# Patient Record
Sex: MALE | Race: BLACK OR AFRICAN AMERICAN | NOT HISPANIC OR LATINO | URBAN - METROPOLITAN AREA
[De-identification: names, ages, dates, MRNs, and addresses within clinical notes are randomized per-mention and may not be internally consistent; named-entity substitution may affect disease eponyms.]

---

## 2023-03-21 ENCOUNTER — OFFICE VISIT (OUTPATIENT)
Dept: FAMILY MEDICINE | Facility: CLINIC | Age: 33
End: 2023-03-21
Payer: COMMERCIAL

## 2023-03-21 VITALS
WEIGHT: 223 LBS | OXYGEN SATURATION: 97 % | HEART RATE: 76 BPM | DIASTOLIC BLOOD PRESSURE: 82 MMHG | RESPIRATION RATE: 16 BRPM | BODY MASS INDEX: 29.42 KG/M2 | TEMPERATURE: 98.6 F | SYSTOLIC BLOOD PRESSURE: 126 MMHG

## 2023-03-21 DIAGNOSIS — Z20.822 EXPOSURE TO 2019 NOVEL CORONAVIRUS: Primary | ICD-10-CM

## 2023-03-21 DIAGNOSIS — F11.20 NARCOTIC DEPENDENCE (H): ICD-10-CM

## 2023-03-21 PROCEDURE — U0003 INFECTIOUS AGENT DETECTION BY NUCLEIC ACID (DNA OR RNA); SEVERE ACUTE RESPIRATORY SYNDROME CORONAVIRUS 2 (SARS-COV-2) (CORONAVIRUS DISEASE [COVID-19]), AMPLIFIED PROBE TECHNIQUE, MAKING USE OF HIGH THROUGHPUT TECHNOLOGIES AS DESCRIBED BY CMS-2020-01-R: HCPCS | Performed by: PHYSICIAN ASSISTANT

## 2023-03-21 PROCEDURE — U0005 INFEC AGEN DETEC AMPLI PROBE: HCPCS | Performed by: PHYSICIAN ASSISTANT

## 2023-03-21 PROCEDURE — 99204 OFFICE O/P NEW MOD 45 MIN: CPT | Mod: CS | Performed by: PHYSICIAN ASSISTANT

## 2023-03-21 NOTE — LETTER
[facility]  [address]  [facility] [address]  [phone]      March 21, 2023    RE:  Edward Glover                                                                                                                                                       [address] [place] [id number]            To whom it may concern:    Edward Glover was seen in clinic today. Please excuse him from work today and tomorrow.        Sincerely,        ADOLPH Dejesus [facility]

## 2023-03-21 NOTE — PATIENT INSTRUCTIONS
COVID exposure:  Patient was educated on the natural course of viral illness.  Isolate at home. If COVID negative then may return to normal activities. Conservative measures discussed including increased fluids, warm steamy shower, and analgesics (Tylenol and/or Ibuprofen). See your primary care provider if symptoms worsen or do not improve in 7 days. Seek emergency care if you develop fever over 104 or shortness of breath.     Narcotic dependence:  Referred to chemical dependence specialist.

## 2023-03-22 ENCOUNTER — TELEPHONE (OUTPATIENT)
Dept: BEHAVIORAL HEALTH | Facility: CLINIC | Age: 33
End: 2023-03-22
Payer: COMMERCIAL

## 2023-03-22 LAB — SARS-COV-2 RNA RESP QL NAA+PROBE: NEGATIVE

## 2023-03-22 NOTE — TELEPHONE ENCOUNTER
Attempted to call patient to offer [facility] services, including walk-in hours. No answer; no voicemail; no MyChart.    [facility]  [address], [address]   [place], [place], [address]  Phone: [phone]  Fax: [phone]    Open Monday-Friday  Closed over lunch hour  Walk in hours: 9am-11:30am and 12:30-3pm    Aminah Cano RN on 3/22/2023 at 9:09 AM

## 2023-03-23 ENCOUNTER — HOSPITAL ENCOUNTER (OUTPATIENT)
Dept: CT IMAGING | Facility: HOSPITAL | Age: 33
Discharge: HOME | End: 2023-03-23
Attending: NURSE PRACTITIONER | Admitting: NURSE PRACTITIONER
Payer: COMMERCIAL

## 2023-03-23 ENCOUNTER — OFFICE VISIT (OUTPATIENT)
Dept: FAMILY MEDICINE | Facility: CLINIC | Age: 33
End: 2023-03-23
Payer: COMMERCIAL

## 2023-03-23 VITALS
TEMPERATURE: 97 F | HEART RATE: 90 BPM | DIASTOLIC BLOOD PRESSURE: 79 MMHG | OXYGEN SATURATION: 97 % | SYSTOLIC BLOOD PRESSURE: 123 MMHG

## 2023-03-23 DIAGNOSIS — R11.0 NAUSEA: ICD-10-CM

## 2023-03-23 DIAGNOSIS — R19.7 DIARRHEA, UNSPECIFIED TYPE: ICD-10-CM

## 2023-03-23 DIAGNOSIS — R11.2 NAUSEA AND VOMITING, UNSPECIFIED VOMITING TYPE: Primary | ICD-10-CM

## 2023-03-23 DIAGNOSIS — R10.31 RLQ ABDOMINAL PAIN: ICD-10-CM

## 2023-03-23 LAB
ANION GAP SERPL CALCULATED.3IONS-SCNC: 5 MMOL/L (ref 7–15)
BASOPHILS # BLD AUTO: 0 10E3/UL (ref 0–0.2)
BASOPHILS NFR BLD AUTO: 0 %
BUN SERPL-MCNC: 15.6 MG/DL (ref 6–20)
CALCIUM SERPL-MCNC: 9.5 MG/DL (ref 8.6–10)
CHLORIDE SERPL-SCNC: 101 MMOL/L (ref 98–107)
CREAT SERPL-MCNC: 0.97 MG/DL (ref 0.67–1.17)
DEPRECATED HCO3 PLAS-SCNC: 32 MMOL/L (ref 22–29)
EOSINOPHIL # BLD AUTO: 0.1 10E3/UL (ref 0–0.7)
EOSINOPHIL NFR BLD AUTO: 1 %
ERYTHROCYTE [DISTWIDTH] IN BLOOD BY AUTOMATED COUNT: 13.2 % (ref 10–15)
GFR SERPL CREATININE-BSD FRML MDRD: >90 ML/MIN/1.73M2
GLUCOSE SERPL-MCNC: 101 MG/DL (ref 70–99)
HCT VFR BLD AUTO: 40 % (ref 40–53)
HGB BLD-MCNC: 13.1 G/DL (ref 13.3–17.7)
IMM GRANULOCYTES # BLD: 0 10E3/UL
IMM GRANULOCYTES NFR BLD: 0 %
LYMPHOCYTES # BLD AUTO: 3.6 10E3/UL (ref 0.8–5.3)
LYMPHOCYTES NFR BLD AUTO: 46 %
MCH RBC QN AUTO: 24.4 PG (ref 26.5–33)
MCHC RBC AUTO-ENTMCNC: 32.8 G/DL (ref 31.5–36.5)
MCV RBC AUTO: 75 FL (ref 78–100)
MONOCYTES # BLD AUTO: 0.8 10E3/UL (ref 0–1.3)
MONOCYTES NFR BLD AUTO: 10 %
NEUTROPHILS # BLD AUTO: 3.4 10E3/UL (ref 1.6–8.3)
NEUTROPHILS NFR BLD AUTO: 43 %
PLATELET # BLD AUTO: 224 10E3/UL (ref 150–450)
POTASSIUM SERPL-SCNC: 3.8 MMOL/L (ref 3.4–5.3)
RBC # BLD AUTO: 5.37 10E6/UL (ref 4.4–5.9)
SODIUM SERPL-SCNC: 138 MMOL/L (ref 136–145)
WBC # BLD AUTO: 7.9 10E3/UL (ref 4–11)

## 2023-03-23 PROCEDURE — 36415 COLL VENOUS BLD VENIPUNCTURE: CPT | Performed by: NURSE PRACTITIONER

## 2023-03-23 PROCEDURE — 74177 CT ABD & PELVIS W/CONTRAST: CPT

## 2023-03-23 PROCEDURE — 99214 OFFICE O/P EST MOD 30 MIN: CPT | Mod: 25 | Performed by: NURSE PRACTITIONER

## 2023-03-23 PROCEDURE — 250N000011 HC RX IP 250 OP 636: Performed by: NURSE PRACTITIONER

## 2023-03-23 PROCEDURE — 85025 COMPLETE CBC W/AUTO DIFF WBC: CPT | Performed by: NURSE PRACTITIONER

## 2023-03-23 PROCEDURE — 96372 THER/PROPH/DIAG INJ SC/IM: CPT | Performed by: NURSE PRACTITIONER

## 2023-03-23 PROCEDURE — 80048 BASIC METABOLIC PNL TOTAL CA: CPT | Performed by: NURSE PRACTITIONER

## 2023-03-23 RX ORDER — ONDANSETRON 4 MG/1
4 TABLET, FILM COATED ORAL EVERY 8 HOURS PRN
Qty: 12 TABLET | Refills: 0 | Status: SHIPPED | OUTPATIENT
Start: 2023-03-23 | End: 2023-11-17

## 2023-03-23 RX ORDER — ONDANSETRON 4 MG/1
4 TABLET, ORALLY DISINTEGRATING ORAL ONCE
Status: COMPLETED | OUTPATIENT
Start: 2023-03-23 | End: 2023-03-23

## 2023-03-23 RX ORDER — KETOROLAC TROMETHAMINE 30 MG/ML
30 INJECTION, SOLUTION INTRAMUSCULAR; INTRAVENOUS ONCE
Status: COMPLETED | OUTPATIENT
Start: 2023-03-23 | End: 2023-03-23

## 2023-03-23 RX ORDER — IOPAMIDOL 755 MG/ML
100 INJECTION, SOLUTION INTRAVASCULAR ONCE
Status: COMPLETED | OUTPATIENT
Start: 2023-03-23 | End: 2023-03-23

## 2023-03-23 RX ADMIN — IOPAMIDOL 100 ML: 755 INJECTION, SOLUTION INTRAVENOUS at 16:35

## 2023-03-23 RX ADMIN — ONDANSETRON 4 MG: 4 TABLET, ORALLY DISINTEGRATING ORAL at 15:51

## 2023-03-23 RX ADMIN — KETOROLAC TROMETHAMINE 30 MG: 30 INJECTION, SOLUTION INTRAMUSCULAR; INTRAVENOUS at 15:52

## 2023-03-23 ASSESSMENT — ENCOUNTER SYMPTOMS
DIZZINESS: 1
WEAKNESS: 1

## 2023-03-23 NOTE — PROGRESS NOTES
Assessment & Plan     RLQ abdominal pain    - CT Abdomen Pelvis w Contrast  - ketorolac (TORADOL) injection 30 mg  - CBC with platelets and differential  - Basic metabolic panel  - CBC with platelets and differential  - Basic metabolic panel    Nausea    - ondansetron (ZOFRAN ODT) ODT tab 4 mg    Diarrhea, unspecified type    - Enteric Bacteria and Virus Panel by CLEMENTINA Stool  - C. difficile Toxin B PCR with reflex to C. difficile Antigen and Toxins A/B EIA  - Cryptosporidium/Giardia Immunoassay    Nausea and vomiting, unspecified vomiting type    - ondansetron (ZOFRAN) 4 MG tablet  Dispense: 12 tablet; Refill: 0     Patient with nausea, vomiting and diarrhea with right-sided lower abdomen pain with guarding that is worse with ambulation and moving.  Single episode of bright red bleeding with diarrhea today.  Patient told to be n.p.o.      CT scan to check for appendicitis or other acute abnormality if negative for appendicitis.    CBC essentially negative.    Due to severity of diarrhea with greater than 10 episodes for 4 days and bleeding noted today, did order stool samples.    Should be rechecked early next week if not much better.  May also use Imodium A-D as needed for greater than 5 or 6 stools.  ER if doing worse, more dizzy.    Patient says he feels much better overall with medications given here including Zofran and Toradol.    Pedialyte, bland diet discussed.  See AVS for details.          Return in about 4 days (around 3/27/2023).    Julianna Rao CNP  [facility]    Kale Leon is a 33 year old male who presents to clinic today for the following health issues:  Chief Complaint   Patient presents with     Vomiting     Seen on 3/21/23 with no improvement     Diarrhea     Dizziness     Lightheaded when standing     Abdominal Pain     R sided abd pain     HPI    Patient was seen on March 21 for what sounds like more of a URI.    Was having N/V/D 2 days ago as well.   "Symptoms started 4 days ago.  Diarrhea and vomiting has gotten worse.  Nothing for pain today.  Having trouble keeping down any fluids or food    Rt sided abd pain is 7/10.  Comes and goes.  \"Tingles when I sit down, worse with walking.\"      Vomited last 1200.  In the last 24 hours, has vomited 8 times.  \"can't keep anything.\"      Diarrhea x 10 times or so in the last 1 day.    Saw a little blood in toilet paper just today.      Fever last night. Felt hot, sweating.  Did not measure.    Still having throat pain.      Also was referred to addiction clinic for opiate abuse.  Was using Percocet pills.  Last Friday had pills, had 2-3.   Uses them here and there, maybe twice a month.    Patient did have antibiotics he believes in January for strep.       Review of Systems   Neurological: Positive for dizziness and weakness.           Objective    /79 (BP Location: Right arm, Patient Position: Sitting, Cuff Size: Adult Regular)   Pulse 90   Temp 97  F (36.1  C) (Tympanic)   SpO2 97%   Physical Exam  Constitutional:       Appearance: He is well-developed.   HENT:      Right Ear: External ear normal.      Left Ear: External ear normal.   Eyes:      General:         Right eye: No discharge.         Left eye: No discharge.      Conjunctiva/sclera: Conjunctivae normal.   Cardiovascular:      Pulses: Normal pulses.   Pulmonary:      Effort: Pulmonary effort is normal.   Abdominal:      General: Bowel sounds are normal.      Tenderness: There is abdominal tenderness. There is guarding (Right lower quadrant).   Musculoskeletal:         General: Normal range of motion.   Skin:     General: Skin is warm.   Neurological:      Mental Status: He is alert and oriented to person, place, and time.   Psychiatric:         Mood and Affect: Mood normal.         Behavior: Behavior normal.         Thought Content: Thought content normal.         Judgment: Judgment normal.        Results for orders placed or performed during the " hospital encounter of 03/23/23   CT Abdomen Pelvis w Contrast     Status: None    Narrative    EXAM: CT ABDOMEN PELVIS W CONTRAST  LOCATION: [facility]  DATE/TIME: 3/23/2023 4:43 PM    INDICATION: RLQ pain  COMPARISON: None.  TECHNIQUE: CT scan of the abdomen and pelvis was performed following injection of IV contrast. Multiplanar reformats were obtained. Dose reduction techniques were used.  CONTRAST: 100 mL Isovue 370    FINDINGS:   LOWER CHEST: No basilar infiltrates    HEPATOBILIARY: No biliary dilatation    PANCREAS: Unremarkable    SPLEEN: Unremarkable    ADRENAL GLANDS: Mild adrenal hyperplasia    KIDNEYS/BLADDER: No hydronephrosis. Normal bladder contour.    BOWEL: Normal caliber appendix. No adjacent inflammatory stranding. No bowel obstruction.    LYMPH NODES: No significant retroperitoneal adenopathy    VASCULATURE: No abdominal aortic aneurysm    PELVIC ORGANS: Multiple pelvic phleboliths. No free fluid.    MUSCULOSKELETAL: No acute bony abnormalities. Mild lumbar scoliosis, apex to the left.      Impression    IMPRESSION:   1.  No CT evidence of appendicitis.   Results for orders placed or performed in visit on 03/23/23   CBC with platelets and differential     Status: Abnormal   Result Value Ref Range    WBC Count 7.9 4.0 - 11.0 10e3/uL    RBC Count 5.37 4.40 - 5.90 10e6/uL    Hemoglobin 13.1 (L) 13.3 - 17.7 g/dL    Hematocrit 40.0 40.0 - 53.0 %    MCV 75 (L) 78 - 100 fL    MCH 24.4 (L) 26.5 - 33.0 pg    MCHC 32.8 31.5 - 36.5 g/dL    RDW 13.2 10.0 - 15.0 %    Platelet Count 224 150 - 450 10e3/uL    % Neutrophils 43 %    % Lymphocytes 46 %    % Monocytes 10 %    % Eosinophils 1 %    % Basophils 0 %    % Immature Granulocytes 0 %    Absolute Neutrophils 3.4 1.6 - 8.3 10e3/uL    Absolute Lymphocytes 3.6 0.8 - 5.3 10e3/uL    Absolute Monocytes 0.8 0.0 - 1.3 10e3/uL    Absolute Eosinophils 0.1 0.0 - 0.7 10e3/uL    Absolute Basophils 0.0 0.0 - 0.2 10e3/uL    Absolute Immature  Granulocytes 0.0 <=0.4 10e3/uL   CBC with platelets and differential     Status: Abnormal    Narrative    The following orders were created for panel order CBC with platelets and differential.  Procedure                               Abnormality         Status                     ---------                               -----------         ------                     CBC with platelets and d...[[id number]]  Abnormal            Final result                 Please view results for these tests on the individual orders.

## 2023-03-23 NOTE — PATIENT INSTRUCTIONS
For vomiting and diarrhea, start with sips of Pedialyte and increase amount as tolerated.  Okay to stop Pedialyte and switch to plain water if starting to eat more normally and diarrhea slows down.      Advance diet to bland if no vomiting for 4 hours and then advance as tolerated. See handout for more info.      Ondansetron as needed for nausea.  Next dose can be around bedtime at 10:00.    If you are having more than 5 or 6 stools per day, you may use Imodium AD available over-the-counter and follow package instructions.    No work until feeling better.    Return stool kits as soon as possible to our lab or any [facility] lab (eg. [place]).      Be seen early next week unless you are doing a lot better.  Sometimes this will resolve on its own if it is due to a virus.

## 2023-03-23 NOTE — LETTER
[address]  [facility] [address]  Phone: [phone]  Fax: [phone]    March 23, 2023        Edward Glover  [address]  [facility] [facility] [place] [address]          To whom it may concern:    RE: Edward Glover    Patient was seen and treated today at our clinic and missed work.  Excuse from work today and tomorrow for illness.    Please contact me for questions or concerns.      Sincerely,        Julianna Rao, CNP

## 2023-03-24 ENCOUNTER — TELEPHONE (OUTPATIENT)
Dept: BEHAVIORAL HEALTH | Facility: CLINIC | Age: 33
End: 2023-03-24
Payer: COMMERCIAL

## 2023-03-24 NOTE — TELEPHONE ENCOUNTER
Reason for call:  Other   Patient called regarding (reason for call): appointment  Additional comments: pt called stating that he was informed  That he can do telephone visit w/nurse to get sbxn prescription. I dis get patient scheduled in clinic for next week. Please call pt back with more details     Phone number to reach patient:  Home number on file [phone] (home)    Best Time:  Any     Can we leave a detailed message on this number?  YES    Travel screening: Negative

## 2023-03-24 NOTE — TELEPHONE ENCOUNTER
RN reviewed message from [facility] and called patient back to see if he had additional questions or needs.     Patient answered the phone and was calm and pleasant. He at first asked for a doctors note to help him get time off work next week to attend the scheduled appointment on 3/29/2023 but then stated he will figure it out. RN asked if he would like to be seen sooner and offered walk-in [facility] hours for today and in general.    Patient states he will come in today at 12:30. May need to cancel his 3/29/2023 appointment if patient presents as a walk-in prior to this.     Hours and location provided:    [facility]  [address], [address]   [place], [address]  Phone: [phone]  Fax: [phone]    Open Monday-Friday  Closed over lunch hour  Walk in hours: 9am-11:30am and 12:30-3pm      Will keep 3/29/2023 for now.     Routing to OBC as LINDSAY Rangel RN on 3/24/2023 at 11:32 AM

## 2023-05-02 ENCOUNTER — OFFICE VISIT (OUTPATIENT)
Dept: FAMILY MEDICINE | Facility: CLINIC | Age: 33
End: 2023-05-02
Payer: COMMERCIAL

## 2023-05-02 VITALS
TEMPERATURE: 98.8 F | BODY MASS INDEX: 30.34 KG/M2 | SYSTOLIC BLOOD PRESSURE: 115 MMHG | WEIGHT: 230 LBS | DIASTOLIC BLOOD PRESSURE: 70 MMHG | HEART RATE: 71 BPM | RESPIRATION RATE: 16 BRPM | OXYGEN SATURATION: 99 %

## 2023-05-02 DIAGNOSIS — H60.502 ACUTE OTITIS EXTERNA OF LEFT EAR, UNSPECIFIED TYPE: Primary | ICD-10-CM

## 2023-05-02 PROCEDURE — 99213 OFFICE O/P EST LOW 20 MIN: CPT | Performed by: NURSE PRACTITIONER

## 2023-05-02 RX ORDER — CIPROFLOXACIN AND DEXAMETHASONE 3; 1 MG/ML; MG/ML
4 SUSPENSION/ DROPS AURICULAR (OTIC) 2 TIMES DAILY
Qty: 2.8 ML | Refills: 0 | Status: SHIPPED | OUTPATIENT
Start: 2023-05-02 | End: 2023-05-09

## 2023-05-02 ASSESSMENT — ENCOUNTER SYMPTOMS
SINUS PRESSURE: 0
COUGH: 0
CHILLS: 0
FATIGUE: 0
WHEEZING: 0
RHINORRHEA: 0
SINUS PAIN: 0
SORE THROAT: 0
FEVER: 0

## 2023-05-17 ENCOUNTER — LAB (OUTPATIENT)
Dept: LAB | Facility: CLINIC | Age: 33
End: 2023-05-17
Payer: COMMERCIAL

## 2023-05-17 ENCOUNTER — OFFICE VISIT (OUTPATIENT)
Dept: BEHAVIORAL HEALTH | Facility: CLINIC | Age: 33
End: 2023-05-17
Payer: COMMERCIAL

## 2023-05-17 VITALS
HEIGHT: 72 IN | SYSTOLIC BLOOD PRESSURE: 123 MMHG | WEIGHT: 234 LBS | HEART RATE: 70 BPM | BODY MASS INDEX: 31.69 KG/M2 | DIASTOLIC BLOOD PRESSURE: 77 MMHG

## 2023-05-17 DIAGNOSIS — F11.93 OPIATE WITHDRAWAL (H): ICD-10-CM

## 2023-05-17 DIAGNOSIS — F11.20 OPIOID USE DISORDER, SEVERE, DEPENDENCE (H): ICD-10-CM

## 2023-05-17 DIAGNOSIS — Z51.81 ENCOUNTER FOR MONITORING OPIOID MAINTENANCE THERAPY: ICD-10-CM

## 2023-05-17 DIAGNOSIS — T40.2X5A THERAPEUTIC OPIOID-INDUCED CONSTIPATION (OIC): ICD-10-CM

## 2023-05-17 DIAGNOSIS — Z79.891 ENCOUNTER FOR MONITORING OPIOID MAINTENANCE THERAPY: ICD-10-CM

## 2023-05-17 DIAGNOSIS — Z79.891 ENCOUNTER FOR MONITORING OPIOID MAINTENANCE THERAPY: Primary | ICD-10-CM

## 2023-05-17 DIAGNOSIS — Z51.81 ENCOUNTER FOR MONITORING OPIOID MAINTENANCE THERAPY: Primary | ICD-10-CM

## 2023-05-17 DIAGNOSIS — Z11.3 SCREEN FOR STD (SEXUALLY TRANSMITTED DISEASE): ICD-10-CM

## 2023-05-17 DIAGNOSIS — K59.03 THERAPEUTIC OPIOID-INDUCED CONSTIPATION (OIC): ICD-10-CM

## 2023-05-17 LAB
ALBUMIN SERPL BCG-MCNC: 4.4 G/DL (ref 3.5–5.2)
ALP SERPL-CCNC: 93 U/L (ref 40–129)
ALT SERPL W P-5'-P-CCNC: 32 U/L (ref 10–50)
AMPHETAMINE QUAL URINE POCT: NEGATIVE
AST SERPL W P-5'-P-CCNC: 28 U/L (ref 10–50)
BARBITURATE QUAL URINE POCT: NEGATIVE
BENZODIAZEPINE QUAL URINE POCT: NEGATIVE
BILIRUB DIRECT SERPL-MCNC: <0.2 MG/DL (ref 0–0.3)
BILIRUB SERPL-MCNC: 0.3 MG/DL
BUPRENORPHINE QUAL URINE POCT: NEGATIVE
C TRACH DNA SPEC QL NAA+PROBE: NEGATIVE
COCAINE QUAL URINE POCT: NEGATIVE
CREATININE QUAL URINE POCT: ABNORMAL
FENTANYL UR QL: NORMAL
HCV AB SERPL QL IA: NONREACTIVE
HIV 1+2 AB+HIV1 P24 AG SERPL QL IA: NONREACTIVE
INTERNAL QC QUAL URINE POCT: ABNORMAL
MDMA QUAL URINE POCT: NEGATIVE
METHADONE QUAL URINE POCT: NEGATIVE
METHAMPHETAMINE QUAL URINE POCT: NEGATIVE
N GONORRHOEA DNA SPEC QL NAA+PROBE: NEGATIVE
OPIATE QUAL URINE POCT: NEGATIVE
OXYCODONE QUAL URINE POCT: NEGATIVE
PH QUAL URINE POCT: ABNORMAL
PHENCYCLIDINE QUAL URINE POCT: NEGATIVE
POCT KIT EXPIRATION DATE: ABNORMAL
POCT KIT LOT NUMBER: ABNORMAL
PROT SERPL-MCNC: 7.4 G/DL (ref 6.4–8.3)
SPECIFIC GRAVITY POCT: 1.02
TEMPERATURE URINE POCT: ABNORMAL
THC QUAL URINE POCT: ABNORMAL

## 2023-05-17 PROCEDURE — 86803 HEPATITIS C AB TEST: CPT

## 2023-05-17 PROCEDURE — 36415 COLL VENOUS BLD VENIPUNCTURE: CPT

## 2023-05-17 PROCEDURE — 87591 N.GONORRHOEAE DNA AMP PROB: CPT | Performed by: NURSE PRACTITIONER

## 2023-05-17 PROCEDURE — 99204 OFFICE O/P NEW MOD 45 MIN: CPT | Performed by: NURSE PRACTITIONER

## 2023-05-17 PROCEDURE — 87389 HIV-1 AG W/HIV-1&-2 AB AG IA: CPT

## 2023-05-17 PROCEDURE — 87491 CHLMYD TRACH DNA AMP PROBE: CPT | Performed by: NURSE PRACTITIONER

## 2023-05-17 PROCEDURE — 82040 ASSAY OF SERUM ALBUMIN: CPT

## 2023-05-17 PROCEDURE — 80307 DRUG TEST PRSMV CHEM ANLYZR: CPT | Performed by: NURSE PRACTITIONER

## 2023-05-17 RX ORDER — BUPRENORPHINE AND NALOXONE 8; 2 MG/1; MG/1
1 FILM, SOLUBLE BUCCAL; SUBLINGUAL 2 TIMES DAILY
Qty: 16 FILM | Refills: 0 | Status: SHIPPED | OUTPATIENT
Start: 2023-05-17 | End: 2023-05-24

## 2023-05-17 RX ORDER — CLONIDINE HYDROCHLORIDE 0.1 MG/1
0.1 TABLET ORAL 3 TIMES DAILY PRN
Qty: 10 TABLET | Refills: 0 | Status: SHIPPED | OUTPATIENT
Start: 2023-05-17 | End: 2023-11-17

## 2023-05-17 RX ORDER — LOPERAMIDE HYDROCHLORIDE 2 MG/1
2 TABLET ORAL ONCE
Qty: 1 TABLET | Refills: 0 | Status: SHIPPED | OUTPATIENT
Start: 2023-05-17 | End: 2023-05-17

## 2023-05-17 RX ORDER — POLYETHYLENE GLYCOL 3350 17 G/17G
1 POWDER, FOR SOLUTION ORAL DAILY
Qty: 578 G | Refills: 0 | Status: SHIPPED | OUTPATIENT
Start: 2023-05-17 | End: 2023-11-17

## 2023-05-17 RX ORDER — TRAZODONE HYDROCHLORIDE 50 MG/1
50 TABLET, FILM COATED ORAL AT BEDTIME
Qty: 15 TABLET | Refills: 0 | Status: SHIPPED | OUTPATIENT
Start: 2023-05-17 | End: 2023-11-17

## 2023-05-17 ASSESSMENT — PATIENT HEALTH QUESTIONNAIRE - PHQ9: SUM OF ALL RESPONSES TO PHQ QUESTIONS 1-9: 17

## 2023-05-17 NOTE — PATIENT INSTRUCTIONS
When it has been AT LEAST 24 hours from your last use of other opioids:        [facility]  [address] [place] [address]    Phone: [phone]    Hours: Monday through Friday 9a-4p; walk in 9a-3p    After hours medical questions: [phone],e

## 2023-05-17 NOTE — LETTER
May 17, 2023      Edward Glover  [address]   [place] [address]        To Whom It May Concern:    Edward Glover  was seen on 5/17/2023  Please excuse him  until 5/18/2023 due to illness.        Sincerely,        Madie Major, CNP

## 2023-05-17 NOTE — PROGRESS NOTES
[facility] Initial Visit    ASSESSMENT/PLAN                                                      1. Opioid use disorder, severe, dependence (H)  33 year old who has  been using prescription percocet 10 mg 1-3 pills daily for past 3 years, last use 5/12/2023.   -Plan to start suboxone, titrate to 16 mg TDD.  - buprenorphine HCl-naloxone HCl (SUBOXONE) 8-2 MG per film; Place 1 Film under the tongue 2 times daily  Dispense: 16 Film; Refill: 0  - naloxone (NARCAN) 4 MG/0.1ML nasal spray; Spray 1 spray (4 mg) into one nostril alternating nostrils as needed for opioid reversal every 2-3 minutes until assistance arrives  Dispense: 0.2 mL; Refill: 11    2. Opiate withdrawal (H)  Reviewed medications for withdrawal.   - cloNIDine (CATAPRES) 0.1 MG tablet; Take 1 tablet (0.1 mg) by mouth 3 times daily as needed (opiate withdrawal)  Dispense: 10 tablet; Refill: 0  - traZODone (DESYREL) 50 MG tablet; Take 1 tablet (50 mg) by mouth At Bedtime  Dispense: 15 tablet; Refill: 0  - loperamide (IMODIUM A-D) 2 MG tablet; Take 1 tablet (2 mg) by mouth once for 1 dose  Dispense: 1 tablet; Refill: 0    3. Encounter for monitoring opioid maintenance therapy  - Drugs of Abuse Screen Urine (POC CUPS) POCT; Standing  - Drugs of Abuse Screen Urine (POC CUPS) POCT  - FENTANYL, QUALITATIVE, WITH REFLEX TO QUANT URINE; Future  - Hepatic panel (Albumin, ALT, AST, Bili, Alk Phos, TP); Future  - FENTANYL, QUALITATIVE, WITH REFLEX TO QUANT URINE    4. Therapeutic opioid-induced constipation (OIC)  - polyethylene glycol (MIRALAX) 17 GM/Dose powder; Take 17 g (1 capful.) by mouth daily  Dispense: 578 g; Refill: 0    5. Screen for STD (sexually transmitted disease)  - NEISSERIA GONORRHOEA PCR  - CHLAMYDIA TRACHOMATIS PCR  - HIV Antigen Antibody Combo; Future  - Hepatitis C Screen Reflex to HCV RNA Quant and Genotype; Future           Return in about 1 week (around 5/24/2023) for Follow up, with me, in person at  "1330pm.    Patient counseling completed today:  Discussed mechanism of action, potential risks/benefits/side effects of medications and other recommendations above.    Discussed risk of precipitated withdrawal with initiation of buprenorphine in the presence of full opioid agonists.    Reviewed directions for initiation of buprenorphine to reduce risk of precipitated withdrawal and maximize efficacy.    Harm reduction counseling including never use alone, availability of naloxone, avoiding combination of opioids with benzodiazepines, alcohol, or other sedatives, safer administration.      Discussed importance of avoiding isolation, building a network of supportive relationships, avoiding people/places/things associated with past use to reduce risk of relapse; including motivational interviewing regarding psychosocial treatment for addiction.     SUBJECTIVE                                                      CC/HPI:  Edward Glover is a 33 year old male with PMH , and opioid use disorder who presents to the Recovery Clinic for initial visit.      Brief History:  Edward Glover was first seen in Recovery Clinic on 05/17/23.  Patient's reasons for seeking treatment on this date include to start suboxone.    Substance Use History :  Opioids:   Age at first use: 29 did use \"lean\" [age]  Current use: substance: percocet 10 mg ; quantity 3 pills up to 6 pills daily; route: oral ; timing of last use: 5/12/23;     Reports that he first started using \"lean\" a codeine cough syrup as a teen. Then he started taking prescription pain medication 3 years ago following a double ear infection, then began buying buying \"real\" percocet off the streets. Uses daily when he can get them. States he will buy what he can getand stretch his supply last, taking 1-3 pills daily, but reports taking up to 6 pills daily. He last took percocet 10 mg 3 pills on 5/12/23. Has been reluctant to take buprenorphine in past stating \"I did not want " "to become addicted to something else\". Has made several attempts to quit on his own, but has been struggling to abstain from opioids. Describes the mental obsession associated with use of opioids making it difficult for him to abstain.        IV drug use: No   History of overdose: No  Previous residential or outpatient treatments for addiction : Yes for court  Previous medication treatments for addiction: No  Longest period of sobriety: 1 weeks  Medical complications related to substance use: denies  Hepatitis C: negative per pt; Date of most recent testing: Unknown  HIV: negative per pt; Date of most recent testing: Unknown    Taking buprenorphine? No     DSM-5 OUD criteria met:  Taken in larger amounts/greater time spent in behavior over longer period of time than intended,Yes:    Persistent desire or unsuccessful efforts to cut down or control use/behavior, Yes:    A great deal of time is spent in activities necessary to obtain the substance/participate in the behavior or recover from its effects, Yes:    Cravings, Yes:    Recurrent use/behavior resulting in failure to fulfill major role obligations at work, school, or home, Yes:    Continued use/behavior despite having persistent or recurrent social or interpersonal problems caused or exacerbated by effects of use/behavior, Yes:    Important social, occupational, or recreational activities are given up or reduced because of use/behavior, Yes:    Recurrent use/behavior in situations in which it is physically hazardous, No   Continued use/behavior despite knowledge of having a persistent or recurrent physical or psychological problem that is likely to have been caused or exacerbated by use/behavior, No   Tolerance, Yes:     Withdrawal, Yes:      Other Addiction History:  Stimulants (cocaine, methamphetamine, MDMA/ecstasy)   Ecstasy as a child  Sedatives/hypnotics/anxiolytics: (benzodiazepines, GHB, Ambien, phenobarbital)  Xanax- age 25  Alcohol:   Rare   Nicotine: " (cigarettes, vaping, chew/snuff)  caping  Cannabis:   Occasionally 5/16/2023   Hallucinogens/Dissociatives: (acid, mushrooms, ketamine)  Occasionally, 2/28  Eating disorder:  denies  Gambling:   denies        [place] Prescription Drug Monitoring Program Reviewed:  Yes; No prescription refills      No past medical history on file.      PAST PSYCHIATRIC HISTORY:  Diagnoses- PTSD  Suicide Attempts: No   Hospitalizations: No         5/17/2023     9:00 AM   PHQ   PHQ-9 Total Score 17   Q9: Thoughts of better off dead/self-harm past 2 weeks Not at all         If PHQ-9 score of 15 or higher, has [facility] therapist or provider been notified? Yes    Any current suicidal ideation? No  If yes, has [facility] therapist or provider been notified? N/A    Mental health provider: denies (follow up on  referral if needed)    Past Surgical History:   Procedure Laterality Date     HERNIA REPAIR, UMBILICAL       OTHER SURGICAL HISTORY       finger fracture repair       Medications:  ondansetron (ZOFRAN) 4 MG tablet, Take 1 tablet (4 mg) by mouth every 8 hours as needed for nausea (Patient not taking: Reported on 5/2/2023)    No current facility-administered medications on file prior to visit.      Allergies   Allergen Reactions     Cephalosporins Unknown     Amoxicillin Rash and Unknown       No family history on file.      Social History  Housing status: alone  Employment status: Employed full time  Relationship status: Single  Children: 6 children  Legal: denies  Insurance needs: active  Contact information up to date? yes    3rd Party Involvement none today (please obtain YOBANY if pt would like to include)    REVIEW OF SYSTEMS:  General: Withdrawal symptoms as described below.  No recent fevers.   Eyes:  No vision concerns.  No jaundice.    Resp: No coughing, wheezing or shortness of breath  CV: No chest pains or palpitations  GI: No complaints other than as above  : No urinary frequency or dysuria, no  discharge  Musculoskeletal: No significant muscle or joint pains other than as above.  No edema  Neurologic: No numbness, tingling, weakness, problems with balance or coordination  Psychiatric: No acute concerns other than as above.   Skin: No rashes or areas of acute infection    OBJECTIVE                                                        Clinical Opioid Withdrawal Scale (COWS)    Resting Pulse Rate  0  =  <=80    Sweating    (over past 1/2 hour) 0  =  no report of chills or flushing   Restlessness  1  =  reports difficulty sitting still, but is able to do so   Pupil size  0  =  pupils pinned or normal size for room light   Bone or Joint Aches    (acute only) 1  =  mild diffuse discomfort   Runny nose or tearing    (unrelated to cold/allergies) 0  =  not present   GI Upset    (over past 1/2 hour) 1  =  stomach cramps   Tremor    (outstretched hands) 0  =  no tremor   Yawning    (during assessment) 0  =  no yawning   Anxiety/Irritability 1  =  patient reports increasing irritability or anxiousness   Gooseflesh skin 0  =  skin is smooth     TOTAL SCORE  Add column for score   4       /77   Pulse 70   Ht 1.829 m (6')   Wt 106.1 kg (234 lb)   BMI 31.74 kg/m      Physical Exam  HENT:      Head: Normocephalic.      Nose: Nose normal.   Eyes:      Conjunctiva/sclera: Conjunctivae normal.   Cardiovascular:      Rate and Rhythm: Normal rate.   Pulmonary:      Effort: Pulmonary effort is normal.   Neurological:      General: No focal deficit present.      Mental Status: He is alert and oriented to person, place, and time.   Psychiatric:         Attention and Perception: Attention normal.         Mood and Affect: Mood normal.         Speech: Speech normal.         Behavior: Behavior is cooperative.         Thought Content: Thought content normal.         Judgment: Judgment normal.         Labs:    UDS:   Lab Results   Component Value Date    BUP Negative 05/17/2023    BZO Negative 05/17/2023    BAR Negative  05/17/2023    FERNANDA Negative 05/17/2023    MAMP Negative 05/17/2023    AMP Negative 05/17/2023    MDMA Negative 05/17/2023    MTD Negative 05/17/2023    OLI331 Negative 05/17/2023    OXY Negative 05/17/2023    PCP Negative 05/17/2023    THC Screen Positive (A) 05/17/2023    TEMP 96 F 05/17/2023    SGPOCT 1.025 05/17/2023       *POC urine drug screen does not screen for Fentanyl    Recent Results (from the past 720 hour(s))   Drugs of Abuse Screen Urine (POC CUPS) POCT    Collection Time: 05/17/23  9:34 AM   Result Value Ref Range    POCT Kit Lot Number [id number]     POCT Kit Expiration Date 2024-10-27     Temperature Urine POCT 96 F 90 F, 92 F, 94 F, 96 F, 98 F, 100 F    Specific [place] POCT 1.025 1.005, 1.015, 1.025    pH Qual Urine POCT 7 pH 4 pH, 5 pH, 7 pH, 9 pH    Creatinine Qual Urine POCT 100 mg/dL 20 mg/dL, 50 mg/dL, 100 mg/dL, 200 mg/dL    Internal QC Qual Urine POCT Valid Valid    Amphetamine Qual Urine POCT Negative Negative    Barbiturate Qual Urine POCT Negative Negative    Buprenorphine Qual Urine POCT Negative Negative    Benzodiazepine Qual Urine POCT Negative Negative    Cocaine Qual Urine POCT Negative Negative    Methamphetamine Qual Urine POCT Negative Negative    MDMA Qual Urine POCT Negative Negative    Methadone Qual Urine POCT Negative Negative    Opiate Qual Urine POCT Negative Negative    Oxycodone Qual Urine POCT Negative Negative    Phencyclidine Qual Urine POCT Negative Negative    THC Qual Urine POCT Screen Positive (A) Negative   Hepatic panel (Albumin, ALT, AST, Bili, Alk Phos, TP)    Collection Time: 05/17/23 10:22 AM   Result Value Ref Range    Protein Total 7.4 6.4 - 8.3 g/dL    Albumin 4.4 3.5 - 5.2 g/dL    Bilirubin Total 0.3 <=1.2 mg/dL    Alkaline Phosphatase 93 40 - 129 U/L    AST 28 10 - 50 U/L    ALT 32 10 - 50 U/L    Bilirubin Direct <0.20 0.00 - 0.30 mg/dL   FENTANYL, QUALITATIVE, WITH REFLEX TO QUANT URINE    Collection Time: 05/17/23 10:33 AM   Result Value Ref Range     Fentanyl Qual Urine Screen Negative Screen Negative               At least 60 min spent in review of medical record,  review, obtaining histories, discussing recommendations, counseling, providing support.      [facility]  [address], [address]  [place] [address] [phone]

## 2023-05-20 ENCOUNTER — HEALTH MAINTENANCE LETTER (OUTPATIENT)
Age: 33
End: 2023-05-20

## 2023-05-24 ENCOUNTER — OFFICE VISIT (OUTPATIENT)
Dept: BEHAVIORAL HEALTH | Facility: CLINIC | Age: 33
End: 2023-05-24
Payer: COMMERCIAL

## 2023-05-24 ENCOUNTER — TELEPHONE (OUTPATIENT)
Dept: BEHAVIORAL HEALTH | Facility: CLINIC | Age: 33
End: 2023-05-24

## 2023-05-24 VITALS — SYSTOLIC BLOOD PRESSURE: 116 MMHG | DIASTOLIC BLOOD PRESSURE: 74 MMHG | HEART RATE: 81 BPM

## 2023-05-24 DIAGNOSIS — F11.20 OPIOID USE DISORDER, SEVERE, DEPENDENCE (H): ICD-10-CM

## 2023-05-24 LAB
AMPHETAMINE QUAL URINE POCT: NEGATIVE
BARBITURATE QUAL URINE POCT: NEGATIVE
BENZODIAZEPINE QUAL URINE POCT: NEGATIVE
BUPRENORPHINE QUAL URINE POCT: ABNORMAL
COCAINE QUAL URINE POCT: NEGATIVE
CREATININE QUAL URINE POCT: ABNORMAL
INTERNAL QC QUAL URINE POCT: ABNORMAL
MDMA QUAL URINE POCT: NEGATIVE
METHADONE QUAL URINE POCT: NEGATIVE
METHAMPHETAMINE QUAL URINE POCT: NEGATIVE
OPIATE QUAL URINE POCT: NEGATIVE
OXYCODONE QUAL URINE POCT: NEGATIVE
PH QUAL URINE POCT: ABNORMAL
PHENCYCLIDINE QUAL URINE POCT: NEGATIVE
POCT KIT EXPIRATION DATE: ABNORMAL
POCT KIT LOT NUMBER: ABNORMAL
SPECIFIC GRAVITY POCT: >=1.03
TEMPERATURE URINE POCT: ABNORMAL
THC QUAL URINE POCT: ABNORMAL

## 2023-05-24 PROCEDURE — 99214 OFFICE O/P EST MOD 30 MIN: CPT | Performed by: FAMILY MEDICINE

## 2023-05-24 RX ORDER — MEPERIDINE HYDROCHLORIDE 25 MG/ML
25 INJECTION INTRAMUSCULAR; INTRAVENOUS; SUBCUTANEOUS EVERY 30 MIN PRN
Status: CANCELLED | OUTPATIENT
Start: 2023-05-24

## 2023-05-24 RX ORDER — ALBUTEROL SULFATE 90 UG/1
1-2 AEROSOL, METERED RESPIRATORY (INHALATION)
Status: CANCELLED
Start: 2023-05-24

## 2023-05-24 RX ORDER — ALBUTEROL SULFATE 0.83 MG/ML
2.5 SOLUTION RESPIRATORY (INHALATION)
Status: CANCELLED | OUTPATIENT
Start: 2023-05-24

## 2023-05-24 RX ORDER — BUPRENORPHINE AND NALOXONE 8; 2 MG/1; MG/1
2 FILM, SOLUBLE BUCCAL; SUBLINGUAL DAILY
Qty: 30 FILM | Refills: 0 | Status: SHIPPED | OUTPATIENT
Start: 2023-05-24 | End: 2023-06-07

## 2023-05-24 RX ORDER — METHYLPREDNISOLONE SODIUM SUCCINATE 125 MG/2ML
125 INJECTION, POWDER, LYOPHILIZED, FOR SOLUTION INTRAMUSCULAR; INTRAVENOUS
Status: CANCELLED
Start: 2023-05-24

## 2023-05-24 RX ORDER — DIPHENHYDRAMINE HYDROCHLORIDE 50 MG/ML
50 INJECTION INTRAMUSCULAR; INTRAVENOUS
Status: CANCELLED
Start: 2023-05-24

## 2023-05-24 RX ORDER — EPINEPHRINE 1 MG/ML
0.3 INJECTION, SOLUTION, CONCENTRATE INTRAVENOUS EVERY 5 MIN PRN
Status: CANCELLED | OUTPATIENT
Start: 2023-05-24

## 2023-05-24 RX ORDER — LIDOCAINE HYDROCHLORIDE 10 MG/ML
2 INJECTION, SOLUTION EPIDURAL; INFILTRATION; INTRACAUDAL; PERINEURAL ONCE
Status: CANCELLED | OUTPATIENT
Start: 2023-05-24 | End: 2023-05-24

## 2023-05-24 ASSESSMENT — PATIENT HEALTH QUESTIONNAIRE - PHQ9: SUM OF ALL RESPONSES TO PHQ QUESTIONS 1-9: 1

## 2023-05-24 NOTE — TELEPHONE ENCOUNTER
Pt is interested in starting Sublocade.  Please obtain insurance authorization.      - I am certified to treat addictions under DATA 2000 waiver, XDE[employer] # [id number] though this is no longer required to prescribe buprenorphine to treat OUD  - I have reviewed recommendations for comprehensive treatment plan with the patient  - I have reviewed the patient's medications comprehensively  and provided education to the patient on risks associated with concurrent use of benzodiazepines, alcohol, other sedatives with opioids  - I have recommended concomitant psychosocial support  - I have complied with all aspects of REMS program for Sublocade. [facility] where Sublocade will be administered is in compliance with all aspects of REMS program.   - Patient meets DSM-5 criteria for moderate or severe opioid use disorder  - Patient has been prescribed buprenorphine 8-24mg/day for >1 week  - Patient will discontinue sublingual buprenorphine when steady state achieved after starting Sublocade  - Patient does not have severe hepatic impairment.   - Patient does not have a history of long QT syndrome  - Patient does not take any antiarrhythmic medications or other medications known to significantly prolong QT interval   - Urine Drug Screen on 5/24/23 was positive for buprenorphine  - Patient will not be receiving methadone while on Sublocade  - Patient will not be receiving any other long acting products for the treatment of opioid use disorder while on Sublocade

## 2023-05-24 NOTE — PROGRESS NOTES
[facility] Return Visit    ASSESSMENT/PLAN                                                      1. Opioid use disorder, severe, dependence (H)  Started buprenorphine without problems, only taking 8mg/day currently.  Difficulty tolerating SL films due to taste.   Endorses some residual cravings.   Discussed ability to increase buprenorphine to 16mg/day.  Discussed once daily dosing to help support adherence.    Discussed option of transfer to XR buprenorphine, he is considering this.   Pt is not interested in psychosocial interventions  Pt did not receive naloxone from pharmacy after initial visit, another rx sent, encouraged pt to fill and keep this available.   Letter for work provided  - Drugs of Abuse Screen Urine (POC CUPS) POCT  - buprenorphine HCl-naloxone HCl (SUBOXONE) 8-2 MG per film; Place 2 Film under the tongue daily  Dispense: 30 Film; Refill: 0  - naloxone (NARCAN) 4 MG/0.1ML nasal spray; Spray 1 spray (4 mg) into one nostril alternating nostrils as needed for opioid reversal every 2-3 minutes until assistance arrives  Dispense: 0.2 mL; Refill: 11       Return in about 2 weeks (around 6/7/2023) for Follow up, in person.    Patient counseling completed today:  Discussed mechanism of action, potential risks/benefits/side effects of medications and other recommendations above.      Harm reduction counseling including never use alone, availability of naloxone, avoiding combination of opioids with benzodiazepines, alcohol, or other sedatives, safer administration.      Discussed importance of avoiding isolation, building a network of supportive relationships, avoiding people/places/things associated with past use to reduce risk of relapse; including motivational interviewing regarding psychosocial treatment for addiction.     SUBJECTIVE                                                      CC/HPI:  Edward Glover is a 33 year old male with PMH , and opioid use disorder who  "presents to the Recovery Clinic for return visit.      Brief History:  Edward Glover was first seen in Recovery Clinic on 05/17/23.  Patient's reasons for seeking treatment on this date include to start suboxone.    Substance Use History :  Opioids:   Age at first use: 29 did use \"lean\" [age]  Current use: substance: percocet 10 mg ; quantity 3- 6 pills daily; route: oral ; timing of last use: 5/12/23;     Reports that he first started using \"lean\" a codeine cough syrup as a teen. Then he started taking prescription pain medication 3 years ago following a double ear infection, then began buying buying \"real\" percocet off the streets. Uses daily when he can get them. States he will buy what he can getand stretch his supply last, taking 1-3 pills daily, but reports taking up to 6 pills daily. He last took percocet 10 mg 3 pills on 5/12/23. Has been reluctant to take buprenorphine in past stating \"I did not want to become addicted to something else\". Has made several attempts to quit on his own, but has been struggling to abstain from opioids. Describes the mental obsession associated with use of opioids making it difficult for him to abstain.   Started buprenorphine through  after initial visit.        IV drug use: No   History of overdose: No  Previous residential or outpatient treatments for addiction : Yes for court  Previous medication treatments for addiction: No  Longest period of sobriety: 5/12/23 to present  Medical complications related to substance use: denies  Hepatitis C:  5/17/23 HCV ab nonreactive  HIV: 5/17/23 HIV ag/ab nonreactive    Other Addiction History:  Stimulants   Ecstasy as a child; h/o cocaine addiction, last use 2017  Sedatives/hypnotics/anxiolytics:   Xanax- age 25  Alcohol:   Rare   Nicotine:   vaping  Cannabis:   Occasionally   Hallucinogens/Dissociatives:   Occasionally  Eating disorder:  denies  Gambling:   denies      A/P from most recent  visit 5/17/23:  1. Opioid use " disorder, severe, dependence (H)  33 year old who has  been using prescription percocet 10 mg 1-3 pills daily for past 3 years, last use 5/12/2023.   -Plan to start suboxone, titrate to 16 mg TDD.  - buprenorphine HCl-naloxone HCl (SUBOXONE) 8-2 MG per film; Place 1 Film under the tongue 2 times daily  Dispense: 16 Film; Refill: 0  - naloxone (NARCAN) 4 MG/0.1ML nasal spray; Spray 1 spray (4 mg) into one nostril alternating nostrils as needed for opioid reversal every 2-3 minutes until assistance arrives  Dispense: 0.2 mL; Refill: 11     2. Opiate withdrawal (H)  Reviewed medications for withdrawal.   - cloNIDine (CATAPRES) 0.1 MG tablet; Take 1 tablet (0.1 mg) by mouth 3 times daily as needed (opiate withdrawal)  Dispense: 10 tablet; Refill: 0  - traZODone (DESYREL) 50 MG tablet; Take 1 tablet (50 mg) by mouth At Bedtime  Dispense: 15 tablet; Refill: 0  - loperamide (IMODIUM A-D) 2 MG tablet; Take 1 tablet (2 mg) by mouth once for 1 dose  Dispense: 1 tablet; Refill: 0     3. Encounter for monitoring opioid maintenance therapy  - Drugs of Abuse Screen Urine (POC CUPS) POCT; Standing  - Drugs of Abuse Screen Urine (POC CUPS) POCT  - FENTANYL, QUALITATIVE, WITH REFLEX TO QUANT URINE; Future  - Hepatic panel (Albumin, ALT, AST, Bili, Alk Phos, TP); Future  - FENTANYL, QUALITATIVE, WITH REFLEX TO QUANT URINE     4. Therapeutic opioid-induced constipation (OIC)  - polyethylene glycol (MIRALAX) 17 GM/Dose powder; Take 17 g (1 capful.) by mouth daily  Dispense: 578 g; Refill: 0     5. Screen for STD (sexually transmitted disease)  - NEISSERIA GONORRHOEA PCR  - CHLAMYDIA TRACHOMATIS PCR  - HIV Antigen Antibody Combo; Future  - Hepatitis C Screen Reflex to HCV RNA Quant and Genotype; Future               Return in about 1 week (around 5/24/2023) for Follow up, with me, in person at 1330pm.      5/24/23 visit:  Pt states he had no problems starting buprenorphine after his initial visit.  Has been taking only 8mg/day (4mg  bid.)  States he has noticed decrease in thoughts about using oxycodone.  No c/o side effects related to buprenorphine.  Does not like the taste of films.  Also still has some ambivalence about buprenorphine due to not wanting to be physically dependent.    Endorses cannabis use, denies other substance use.      [place] Prescription Drug Monitoring Program Reviewed:  Yes  05/17/2023  1   05/17/2023  Suboxone 8 Mg-2 MG SL Film  16.00  8 He Bat   [id number]   Wal (1786)   0/0  16.00 mg  Medicaid   [place]         No past medical history on file.      PAST PSYCHIATRIC HISTORY:  Diagnoses- PTSD  Suicide Attempts: Yes attempted to OD w/ cocaine 2017, entered treatment after   Hospitalizations: No         5/17/2023     9:00 AM 5/24/2023     1:00 PM   PHQ   PHQ-9 Total Score 17 1   Q9: Thoughts of better off dead/self-harm past 2 weeks Not at all Not at all       Mental health provider: denies     Past Surgical History:   Procedure Laterality Date     HERNIA REPAIR, UMBILICAL       OTHER SURGICAL HISTORY       finger fracture repair       Medications:  buprenorphine HCl-naloxone HCl (SUBOXONE) 8-2 MG per film, Place 1 Film under the tongue 2 times daily  cloNIDine (CATAPRES) 0.1 MG tablet, Take 1 tablet (0.1 mg) by mouth 3 times daily as needed (opiate withdrawal)  naloxone (NARCAN) 4 MG/0.1ML nasal spray, Spray 1 spray (4 mg) into one nostril alternating nostrils as needed for opioid reversal every 2-3 minutes until assistance arrives  ondansetron (ZOFRAN) 4 MG tablet, Take 1 tablet (4 mg) by mouth every 8 hours as needed for nausea (Patient not taking: Reported on 5/2/2023)  polyethylene glycol (MIRALAX) 17 GM/Dose powder, Take 17 g (1 capful.) by mouth daily  traZODone (DESYREL) 50 MG tablet, Take 1 tablet (50 mg) by mouth At Bedtime    No current facility-administered medications on file prior to visit.      Allergies   Allergen Reactions     Cephalosporins Unknown     Amoxicillin Rash and Unknown       No family history on  file.      Social History  Housing status: alone  Employment status: Employed full time  Relationship status: Single  Children: 6 children  Legal: denies      REVIEW OF SYSTEMS:  No other concerns    OBJECTIVE                                                      /74   Pulse 81     Physical Exam  HENT:      Head: Normocephalic.      Nose: Nose normal.   Eyes:      Conjunctiva/sclera: Conjunctivae normal.   Cardiovascular:      Rate and Rhythm: Normal rate.   Pulmonary:      Effort: Pulmonary effort is normal.   Neurological:      General: No focal deficit present.      Mental Status: He is alert and oriented to person, place, and time.   Psychiatric:         Attention and Perception: Attention normal.         Mood and Affect: Mood normal.         Speech: Speech normal.         Behavior: Behavior is cooperative.         Thought Content: Thought content normal.         Judgment: Judgment normal.         Labs:    UDS:   Lab Results   Component Value Date    BUP Screen Positive (A) 05/24/2023    BZO Negative 05/24/2023    BAR Negative 05/24/2023    FERNANDA Negative 05/24/2023    MAMP Negative 05/24/2023    AMP Negative 05/24/2023    MDMA Negative 05/24/2023    MTD Negative 05/24/2023    [id number] Negative 05/24/2023    OXY Negative 05/24/2023    PCP Negative 05/24/2023    THC Screen Positive (A) 05/24/2023    TEMP 96 F 05/24/2023    SGPOCT >=1.030 (A) 05/24/2023       *POC urine drug screen does not screen for Fentanyl    Recent Results (from the past 720 hour(s))   Drugs of Abuse Screen Urine (POC CUPS) POCT    Collection Time: 05/17/23  9:34 AM   Result Value Ref Range    POCT Kit Lot Number [id number]     POCT Kit Expiration Date 2024-10-27     Temperature Urine POCT 96 F 90 F, 92 F, 94 F, 96 F, 98 F, 100 F    Specific [place] POCT 1.025 1.005, 1.015, 1.025    pH Qual Urine POCT 7 pH 4 pH, 5 pH, 7 pH, 9 pH    Creatinine Qual Urine POCT 100 mg/dL 20 mg/dL, 50 mg/dL, 100 mg/dL, 200 mg/dL    Internal QC Qual Urine POCT  Valid Valid    Amphetamine Qual Urine POCT Negative Negative    Barbiturate Qual Urine POCT Negative Negative    Buprenorphine Qual Urine POCT Negative Negative    Benzodiazepine Qual Urine POCT Negative Negative    Cocaine Qual Urine POCT Negative Negative    Methamphetamine Qual Urine POCT Negative Negative    MDMA Qual Urine POCT Negative Negative    Methadone Qual Urine POCT Negative Negative    Opiate Qual Urine POCT Negative Negative    Oxycodone Qual Urine POCT Negative Negative    Phencyclidine Qual Urine POCT Negative Negative    THC Qual Urine POCT Screen Positive (A) Negative   NEISSERIA GONORRHOEA PCR    Collection Time: 05/17/23 10:22 AM    Specimen: Urine, Voided   Result Value Ref Range    Neisseria gonorrhoeae Negative Negative   CHLAMYDIA TRACHOMATIS PCR    Collection Time: 05/17/23 10:22 AM    Specimen: Urine, Voided   Result Value Ref Range    Chlamydia trachomatis Negative Negative   HIV Antigen Antibody Combo    Collection Time: 05/17/23 10:22 AM   Result Value Ref Range    HIV Antigen Antibody Combo Nonreactive Nonreactive   Hepatitis C Screen Reflex to HCV RNA Quant and Genotype    Collection Time: 05/17/23 10:22 AM   Result Value Ref Range    Hepatitis C Antibody Nonreactive Nonreactive   Hepatic panel (Albumin, ALT, AST, Bili, Alk Phos, TP)    Collection Time: 05/17/23 10:22 AM   Result Value Ref Range    Protein Total 7.4 6.4 - 8.3 g/dL    Albumin 4.4 3.5 - 5.2 g/dL    Bilirubin Total 0.3 <=1.2 mg/dL    Alkaline Phosphatase 93 40 - 129 U/L    AST 28 10 - 50 U/L    ALT 32 10 - 50 U/L    Bilirubin Direct <0.20 0.00 - 0.30 mg/dL   FENTANYL, QUALITATIVE, WITH REFLEX TO QUANT URINE    Collection Time: 05/17/23 10:33 AM   Result Value Ref Range    Fentanyl Qual Urine Screen Negative Screen Negative   Drugs of Abuse Screen Urine (POC CUPS) POCT    Collection Time: 05/24/23  1:26 PM   Result Value Ref Range    POCT Kit Lot Number [id number]     POCT Kit Expiration Date 89293548     Temperature Urine  POCT 96 F 90 F, 92 F, 94 F, 96 F, 98 F, 100 F    Specific Gravity POCT >=1.030 (A) 1.005, 1.015, 1.025    pH Qual Urine POCT 5 pH 4 pH, 5 pH, 7 pH, 9 pH    Creatinine Qual Urine POCT 100 mg/dL 20 mg/dL, 50 mg/dL, 100 mg/dL, 200 mg/dL    Internal QC Qual Urine POCT Valid Valid    Amphetamine Qual Urine POCT Negative Negative    Barbiturate Qual Urine POCT Negative Negative    Buprenorphine Qual Urine POCT Screen Positive (A) Negative    Benzodiazepine Qual Urine POCT Negative Negative    Cocaine Qual Urine POCT Negative Negative    Methamphetamine Qual Urine POCT Negative Negative    MDMA Qual Urine POCT Negative Negative    Methadone Qual Urine POCT Negative Negative    Opiate Qual Urine POCT Negative Negative    Oxycodone Qual Urine POCT Negative Negative    Phencyclidine Qual Urine POCT Negative Negative    THC Qual Urine POCT Screen Positive (A) Negative               At least 30 min spent in review of medical record,  review, obtaining histories, discussing recommendations, counseling, providing support.      Odalis Jama MD  Addiction Medicine  [place] [id number] [address] [address]  [phone]

## 2023-05-24 NOTE — NURSING NOTE
[facility]      Rooming information:  Approximate last use of full opioid agonist: 5/12/23  Taking buprenorphine? Yes:  As prescribed? Yes:   Number of buprenorphine films/tablets remaining currently: 7-8  Side effects related to buprenorphine (constipation, dry mouth, sedation?) Yes: some days nausea and stuff   Narcan currently available: No  Other recent substance use:    Cannabis   NICOTINE No    Point of care urine drug screen positive for:  Lab Results   Component Value Date    BUP Screen Positive (A) 05/24/2023    BZO Negative 05/24/2023    BAR Negative 05/24/2023    FERNANDA Negative 05/24/2023    MAMP Negative 05/24/2023    AMP Negative 05/24/2023    MDMA Negative 05/24/2023    MTD Negative 05/24/2023    [id number] Negative 05/24/2023    OXY Negative 05/24/2023    PCP Negative 05/24/2023    THC Screen Positive (A) 05/24/2023    TEMP 96 F 05/24/2023    SGPOCT >=1.030 (A) 05/24/2023       *POC urine drug screen does not screen for Fentanyl            5/24/2023     1:00 PM   PHQ Assesment Total Score(s)   PHQ-9 Score 1       If PHQ-9 score of 15 or higher, has [facility] therapist or provider been notified? No    Any current suicidal ideation? No  If yes, has [facility] therapist or provider been notified? N/A    Primary care provider: Saba Hickman MD     Mental health provider: analy (follow up on MH referral if needed)    Insurance needs: acvtive    Housing needs: stable    Contact information up to date? yes    3rd Party Involvement not today (please obtain YOBANY if pt would like to include)    Shruthi Barkley MA  May 24, 2023  1:18 PM

## 2023-05-25 ENCOUNTER — TELEPHONE (OUTPATIENT)
Dept: BEHAVIORAL HEALTH | Facility: CLINIC | Age: 33
End: 2023-05-25
Payer: COMMERCIAL

## 2023-05-25 NOTE — TELEPHONE ENCOUNTER
Writer informed patient via [employer] message that Sublocade prior authorization was approved. Provided scheduling # for [facility], [phone]. Reiterated to patient the importance of taking buprenorphine as prescribed without opiate use for at least 7 days leading up to Sublocade injection.     Rashida Ordaz RN on 5/25/2023 at 4:41 PM

## 2023-05-25 NOTE — TELEPHONE ENCOUNTER
Received fax from pharmacy requesting prior authorization for naloxone. Phone call to pharmacy. Prescription went through insurance without difficulty when ran as brand name. No prior authorization needed.    Rashida Ordaz RN on 5/25/2023 at 8:34 AM

## 2023-06-06 NOTE — PROGRESS NOTES
"[facility] Return Visit    ASSESSMENT/PLAN                                                    1. Opioid use disorder, severe, dependence (H)  Controlled.  Wants to transfer to Sublocade.   Continue SL buprenorphine 16mg/day for now  Sublocade #1 scheduled 6/26, contacted infusion center during visit.   Letter for work provided.   - Drugs of Abuse Screen Urine (POC CUPS) POCT  - buprenorphine HCl-naloxone HCl (SUBOXONE) 8-2 MG per film; Place 2 Film under the tongue daily  Dispense: 60 Film; Refill: 0      Return in 19 days (on 6/26/2023) for Follow up, in person after Sublocade #1.    Patient counseling completed today:  Discussed mechanism of action, potential risks/benefits/side effects of medications and other recommendations above.      Harm reduction counseling including never use alone, availability of naloxone, avoiding combination of opioids with benzodiazepines, alcohol, or other sedatives.      Discussed importance of avoiding isolation, building a network of supportive relationships, avoiding people/places/things associated with past use to reduce risk of relapse; including motivational interviewing regarding psychosocial treatment for addiction.     SUBJECTIVE                                                      CC/HPI:  Edward Glover is a 33 year old male with PMH , and opioid use disorder who presents to the [facility] for return visit.      Brief History:  Edward Glover was first seen in [facility] on 05/17/23.  Patient's reasons for seeking treatment on this date include to start suboxone.    Substance Use History :  Opioids:   Age at first use: 29 did use \"lean\" [age]  Current use: substance: percocet 10 mg ; quantity 3- 6 pills daily; route: oral ; timing of last use: 5/12/23;     Reports that he first started using \"lean\" a codeine cough syrup as a teen. Then he started taking prescription pain medication 3 years ago following a double ear infection, " "then began buying buying \"real\" percocet off the streets. Uses daily when he can get them. States he will buy what he can getand stretch his supply last, taking 1-3 pills daily, but reports taking up to 6 pills daily. He last took percocet 10 mg 3 pills on 5/12/23. Has been reluctant to take buprenorphine in past stating \"I did not want to become addicted to something else\". Has made several attempts to quit on his own, but has been struggling to abstain from opioids. Describes the mental obsession associated with use of opioids making it difficult for him to abstain.   Started buprenorphine through [facility] after initial visit.        IV drug use: No   History of overdose: No  Previous residential or outpatient treatments for addiction : Yes for court  Previous medication treatments for addiction: No  Longest period of sobriety: 5/12/23 to present  Medical complications related to substance use: denies  Hepatitis C:  5/17/23 HCV ab nonreactive  HIV: 5/17/23 HIV ag/ab nonreactive    Other Addiction History:  Stimulants   Ecstasy as a child; h/o cocaine addiction, last use 2017  Sedatives/hypnotics/anxiolytics:   Xanax- age 25  Alcohol:   Rare   Nicotine:   vaping  Cannabis:   Occasionally   Hallucinogens/Dissociatives:   Occasionally  Eating disorder:  denies  Gambling:   denies      A/P from most recent  visit 5/24/23:  1. Opioid use disorder, severe, dependence (H)  Started buprenorphine without problems, only taking 8mg/day currently.  Difficulty tolerating SL films due to taste.   Endorses some residual cravings.   Discussed ability to increase buprenorphine to 16mg/day.  Discussed once daily dosing to help support adherence.    Discussed option of transfer to XR buprenorphine, he is considering this.   Pt is not interested in psychosocial interventions  Pt did not receive naloxone from pharmacy after initial visit, another rx sent, encouraged pt to fill and keep this available.   Letter for work provided  - Drugs " of Abuse Screen Urine (POC CUPS) POCT  - buprenorphine HCl-naloxone HCl (SUBOXONE) 8-2 MG per film; Place 2 Film under the tongue daily  Dispense: 30 Film; Refill: 0  - naloxone (NARCAN) 4 MG/0.1ML nasal spray; Spray 1 spray (4 mg) into one nostril alternating nostrils as needed for opioid reversal every 2-3 minutes until assistance arrives  Dispense: 0.2 mL; Refill: 11                 Return in about 2 weeks (around 6/7/2023) for Follow up, in person.      6/7/23 visit:  Pt states he has continued to take buprenorphine 16mg/day as prescribed.  No c/o opioid cravings.  Experiencing side effects of constipation and dry mouth, and does not like the taste. Miralax helpful for dry mouth. Interested in transfer to Sublocade.      [place] Prescription Drug Monitoring Program Reviewed:  Yes  05/24/2023 05/24/2023   1  Suboxone 8 Mg-2 Mg Sl Film 30.00  15   Vol  [id number]   [facility] ([id number])  0/0  16.00 mg  Medicaid [place]    05/17/2023 05/17/2023   1  Suboxone 8 Mg-2 Mg Sl Film 16.00  8  [place]  [id number]   [facility] ([id number])  0/0               No past medical history on file.      PAST PSYCHIATRIC HISTORY:  Diagnoses- PTSD  Suicide Attempts: Yes attempted to OD w/ cocaine 2017, entered treatment after   Hospitalizations: No         5/17/2023     9:00 AM 5/24/2023     1:00 PM   PHQ   PHQ-9 Total Score 17 1   Q9: Thoughts of better off dead/self-harm past 2 weeks Not at all Not at all       Mental health provider: denies     Past Surgical History:   Procedure Laterality Date     HERNIA REPAIR, UMBILICAL       OTHER SURGICAL HISTORY       finger fracture repair       Medications:  buprenorphine HCl-naloxone HCl (SUBOXONE) 8-2 MG per film, Place 2 Film under the tongue daily  cloNIDine (CATAPRES) 0.1 MG tablet, Take 1 tablet (0.1 mg) by mouth 3 times daily as needed (opiate withdrawal)  naloxone (NARCAN) 4 MG/0.1ML nasal spray, Spray 1 spray (4 mg) into one nostril alternating nostrils as needed for opioid reversal every 2-3 minutes until  assistance arrives  ondansetron (ZOFRAN) 4 MG tablet, Take 1 tablet (4 mg) by mouth every 8 hours as needed for nausea (Patient not taking: Reported on 5/2/2023)  polyethylene glycol (MIRALAX) 17 GM/Dose powder, Take 17 g (1 capful.) by mouth daily  traZODone (DESYREL) 50 MG tablet, Take 1 tablet (50 mg) by mouth At Bedtime    No current facility-administered medications on file prior to visit.      Allergies   Allergen Reactions     Cephalosporins Unknown     Amoxicillin Rash and Unknown       No family history on file.      Social History  Housing status: alone  Employment status: Employed full time  Relationship status: Single  Children: 6 children  Legal: denies      REVIEW OF SYSTEMS:  No other concerns    OBJECTIVE                                                      There were no vitals taken for this visit.    Physical Exam  HENT:      Head: Normocephalic.      Nose: Nose normal.   Eyes:      Conjunctiva/sclera: Conjunctivae normal.   Cardiovascular:      Rate and Rhythm: Normal rate.   Pulmonary:      Effort: Pulmonary effort is normal.   Neurological:      General: No focal deficit present.      Mental Status: He is alert and oriented to person, place, and time.   Psychiatric:         Attention and Perception: Attention normal.         Mood and Affect: Mood normal.         Speech: Speech normal.         Behavior: Behavior is cooperative.         Thought Content: Thought content normal.         Judgment: Judgment normal.         Labs:    UDS:   Lab Results   Component Value Date    BUP Screen Positive (A) 05/24/2023    BZO Negative 05/24/2023    BAR Negative 05/24/2023    FERNANDA Negative 05/24/2023    MAMP Negative 05/24/2023    AMP Negative 05/24/2023    MDMA Negative 05/24/2023    MTD Negative 05/24/2023    [id number] Negative 05/24/2023    OXY Negative 05/24/2023    PCP Negative 05/24/2023    THC Screen Positive (A) 05/24/2023    TEMP 96 F 05/24/2023    SGPOCT >=1.030 (A) 05/24/2023       *POC urine drug screen  does not screen for Fentanyl    Recent Results (from the past 720 hour(s))   Drugs of Abuse Screen Urine (POC CUPS) POCT    Collection Time: 05/17/23  9:34 AM   Result Value Ref Range    POCT Kit Lot Number [id number]     POCT Kit Expiration Date 2024-10-27     Temperature Urine POCT 96 F 90 F, 92 F, 94 F, 96 F, 98 F, 100 F    Specific [place] POCT 1.025 1.005, 1.015, 1.025    pH Qual Urine POCT 7 pH 4 pH, 5 pH, 7 pH, 9 pH    Creatinine Qual Urine POCT 100 mg/dL 20 mg/dL, 50 mg/dL, 100 mg/dL, 200 mg/dL    Internal QC Qual Urine POCT Valid Valid    Amphetamine Qual Urine POCT Negative Negative    Barbiturate Qual Urine POCT Negative Negative    Buprenorphine Qual Urine POCT Negative Negative    Benzodiazepine Qual Urine POCT Negative Negative    Cocaine Qual Urine POCT Negative Negative    Methamphetamine Qual Urine POCT Negative Negative    MDMA Qual Urine POCT Negative Negative    Methadone Qual Urine POCT Negative Negative    Opiate Qual Urine POCT Negative Negative    Oxycodone Qual Urine POCT Negative Negative    Phencyclidine Qual Urine POCT Negative Negative    THC Qual Urine POCT Screen Positive (A) Negative   NEISSERIA GONORRHOEA PCR    Collection Time: 05/17/23 10:22 AM    Specimen: Urine, Voided   Result Value Ref Range    Neisseria gonorrhoeae Negative Negative   CHLAMYDIA TRACHOMATIS PCR    Collection Time: 05/17/23 10:22 AM    Specimen: Urine, Voided   Result Value Ref Range    Chlamydia trachomatis Negative Negative   HIV Antigen Antibody Combo    Collection Time: 05/17/23 10:22 AM   Result Value Ref Range    HIV Antigen Antibody Combo Nonreactive Nonreactive   Hepatitis C Screen Reflex to HCV RNA Quant and Genotype    Collection Time: 05/17/23 10:22 AM   Result Value Ref Range    Hepatitis C Antibody Nonreactive Nonreactive   Hepatic panel (Albumin, ALT, AST, Bili, Alk Phos, TP)    Collection Time: 05/17/23 10:22 AM   Result Value Ref Range    Protein Total 7.4 6.4 - 8.3 g/dL    Albumin 4.4 3.5 - 5.2  g/dL    Bilirubin Total 0.3 <=1.2 mg/dL    Alkaline Phosphatase 93 40 - 129 U/L    AST 28 10 - 50 U/L    ALT 32 10 - 50 U/L    Bilirubin Direct <0.20 0.00 - 0.30 mg/dL   FENTANYL, QUALITATIVE, WITH REFLEX TO QUANT URINE    Collection Time: 05/17/23 10:33 AM   Result Value Ref Range    Fentanyl Qual Urine Screen Negative Screen Negative   Drugs of Abuse Screen Urine (POC CUPS) POCT    Collection Time: 05/24/23  1:26 PM   Result Value Ref Range    POCT Kit Lot Number [id number]     POCT Kit Expiration Date 30327940     Temperature Urine POCT 96 F 90 F, 92 F, 94 F, 96 F, 98 F, 100 F    Specific [place] POCT >=1.030 (A) 1.005, 1.015, 1.025    pH Qual Urine POCT 5 pH 4 pH, 5 pH, 7 pH, 9 pH    Creatinine Qual Urine POCT 100 mg/dL 20 mg/dL, 50 mg/dL, 100 mg/dL, 200 mg/dL    Internal QC Qual Urine POCT Valid Valid    Amphetamine Qual Urine POCT Negative Negative    Barbiturate Qual Urine POCT Negative Negative    Buprenorphine Qual Urine POCT Screen Positive (A) Negative    Benzodiazepine Qual Urine POCT Negative Negative    Cocaine Qual Urine POCT Negative Negative    Methamphetamine Qual Urine POCT Negative Negative    MDMA Qual Urine POCT Negative Negative    Methadone Qual Urine POCT Negative Negative    Opiate Qual Urine POCT Negative Negative    Oxycodone Qual Urine POCT Negative Negative    Phencyclidine Qual Urine POCT Negative Negative    THC Qual Urine POCT Screen Positive (A) Negative               At least 30 min spent in review of medical record,  review, obtaining histories, discussing recommendations, counseling, coordinating care.      Odalis Jama MD  Addiction Medicine  [address] [id number] [phone]

## 2023-06-07 ENCOUNTER — OFFICE VISIT (OUTPATIENT)
Dept: BEHAVIORAL HEALTH | Facility: CLINIC | Age: 33
End: 2023-06-07
Payer: COMMERCIAL

## 2023-06-07 VITALS — DIASTOLIC BLOOD PRESSURE: 80 MMHG | SYSTOLIC BLOOD PRESSURE: 120 MMHG | HEART RATE: 72 BPM

## 2023-06-07 DIAGNOSIS — F11.20 OPIOID USE DISORDER, SEVERE, DEPENDENCE (H): ICD-10-CM

## 2023-06-07 LAB
AMPHETAMINE QUAL URINE POCT: NEGATIVE
BARBITURATE QUAL URINE POCT: NEGATIVE
BENZODIAZEPINE QUAL URINE POCT: NEGATIVE
BUPRENORPHINE QUAL URINE POCT: ABNORMAL
COCAINE QUAL URINE POCT: NEGATIVE
CREATININE QUAL URINE POCT: ABNORMAL
INTERNAL QC QUAL URINE POCT: ABNORMAL
MDMA QUAL URINE POCT: NEGATIVE
METHADONE QUAL URINE POCT: NEGATIVE
METHAMPHETAMINE QUAL URINE POCT: NEGATIVE
OPIATE QUAL URINE POCT: NEGATIVE
OXYCODONE QUAL URINE POCT: NEGATIVE
PH QUAL URINE POCT: ABNORMAL
PHENCYCLIDINE QUAL URINE POCT: NEGATIVE
POCT KIT EXPIRATION DATE: ABNORMAL
POCT KIT LOT NUMBER: ABNORMAL
SPECIFIC GRAVITY POCT: 1.02
TEMPERATURE URINE POCT: ABNORMAL
THC QUAL URINE POCT: ABNORMAL

## 2023-06-07 PROCEDURE — 99214 OFFICE O/P EST MOD 30 MIN: CPT | Performed by: FAMILY MEDICINE

## 2023-06-07 PROCEDURE — 80305 DRUG TEST PRSMV DIR OPT OBS: CPT | Performed by: FAMILY MEDICINE

## 2023-06-07 RX ORDER — BUPRENORPHINE AND NALOXONE 8; 2 MG/1; MG/1
2 FILM, SOLUBLE BUCCAL; SUBLINGUAL DAILY
Qty: 60 FILM | Refills: 0 | Status: SHIPPED | OUTPATIENT
Start: 2023-06-07 | End: 2023-07-06

## 2023-06-07 ASSESSMENT — PATIENT HEALTH QUESTIONNAIRE - PHQ9: SUM OF ALL RESPONSES TO PHQ QUESTIONS 1-9: 1

## 2023-06-07 NOTE — NURSING NOTE
[facility]      Rooming information:  Approximate last use of full opioid agonist: 5/12/23  Taking buprenorphine? Yes:  As prescribed? Yes:   Number of buprenorphine films/tablets remaining currently: 0  Side effects related to buprenorphine (constipation, dry mouth, sedation?) Yes: constipation and dry mouth   Narcan currently available: Yes  Other recent substance use:    Cannabis   NICOTINE-No    Point of care urine drug screen positive for:  Lab Results   Component Value Date    BUP Screen Positive (A) 06/07/2023    BZO Negative 06/07/2023    BAR Negative 06/07/2023    FERNANDA Negative 06/07/2023    MAMP Negative 06/07/2023    AMP Negative 06/07/2023    MDMA Negative 06/07/2023    MTD Negative 06/07/2023    [id number] Negative 06/07/2023    OXY Negative 06/07/2023    PCP Negative 06/07/2023    THC Screen Positive (A) 06/07/2023    TEMP 94 F 06/07/2023    SGPOCT 1.025 06/07/2023       *POC urine drug screen does not screen for Fentanyl          6/7/2023     2:00 PM   PHQ Assesment Total Score(s)   PHQ-9 Score 1       If PHQ-9 score of 15 or higher, has [facility] therapist or provider been notified? N/A    Any current suicidal ideation? No  If yes, has [facility] therapist or provider been notified? N/A    Primary care provider: Saba Hickman MD     Mental health provider: denies (follow up on MH referral if needed)    Insurance needs: active    Housing needs: stable    Contact information up to date? yes    3rd Party Involvement none today (please obtain YOBANY if pt would like to include)    Susana Malone CMA  June 7, 2023  2:25 PM'

## 2023-06-26 ENCOUNTER — TELEPHONE (OUTPATIENT)
Dept: BEHAVIORAL HEALTH | Facility: CLINIC | Age: 33
End: 2023-06-26

## 2023-06-26 NOTE — TELEPHONE ENCOUNTER
Patient was no show to Sublocade injection today; scheduled at [facility] for after injection. Phone call to patient. Reports he got out of work late. Patient requested to reschedule Sublocade for 7/19. Patient will need to see [facility] provider before then for Suboxone refill. Scheduled with Madie Major 7/6/23 per pt preference for late appt on Thurs.    Attempted to reschedule Sublocade, but no answer at Infusion Center. Will reschedule at patient's [facility] visit 7/6/23.    Routing to Dr. Jama as LINDSAY.    Aminah Cano RN on 6/26/2023 at 4:23 PM

## 2023-07-06 ENCOUNTER — OFFICE VISIT (OUTPATIENT)
Dept: BEHAVIORAL HEALTH | Facility: CLINIC | Age: 33
End: 2023-07-06
Payer: COMMERCIAL

## 2023-07-06 VITALS — SYSTOLIC BLOOD PRESSURE: 126 MMHG | HEART RATE: 69 BPM | DIASTOLIC BLOOD PRESSURE: 83 MMHG

## 2023-07-06 DIAGNOSIS — F11.20 OPIOID USE DISORDER, SEVERE, DEPENDENCE (H): ICD-10-CM

## 2023-07-06 PROCEDURE — 99214 OFFICE O/P EST MOD 30 MIN: CPT | Performed by: NURSE PRACTITIONER

## 2023-07-06 PROCEDURE — 80305 DRUG TEST PRSMV DIR OPT OBS: CPT | Performed by: NURSE PRACTITIONER

## 2023-07-06 RX ORDER — BUPRENORPHINE AND NALOXONE 8; 2 MG/1; MG/1
2 FILM, SOLUBLE BUCCAL; SUBLINGUAL 2 TIMES DAILY
Qty: 60 FILM | Refills: 0 | Status: SHIPPED | OUTPATIENT
Start: 2023-07-06 | End: 2023-07-07

## 2023-07-06 ASSESSMENT — PATIENT HEALTH QUESTIONNAIRE - PHQ9: SUM OF ALL RESPONSES TO PHQ QUESTIONS 1-9: 0

## 2023-07-06 NOTE — NURSING NOTE
[facility]  States Shelter he is staying at  said they cannot find his other box of suboxone he only has abut 5 left, they are administered to him    Rooming information:  Approximate last use of full opioid agonist: 5/12/23  Taking buprenorphine? Yes:  As prescribed? Yes:   Number of buprenorphine films/tablets remaining currently: 5  Side effects related to buprenorphine (constipation, dry mouth, sedation?) No   Narcan currently available: Yes  Other recent substance use:    Denies  NICOTINE-No      Point of care urine drug screen positive for:  Lab Results   Component Value Date    BUP Screen Positive (A) 07/06/2023    BZO Negative 07/06/2023    BAR Negative 07/06/2023    FERNANDA Negative 07/06/2023    MAMP Negative 07/06/2023    AMP Negative 07/06/2023    MDMA Negative 07/06/2023    MTD Negative 07/06/2023    [id number] Negative 07/06/2023    OXY Negative 07/06/2023    PCP Negative 07/06/2023    THC Screen Positive (A) 07/06/2023    TEMP 94 F 07/06/2023    SGPOCT >=1.030 (A) 07/06/2023       *POC urine drug screen does not screen for Fentanyl            7/6/2023     3:00 PM   PHQ Assesment Total Score(s)   PHQ-9 Score 0       If PHQ-9 score of 15 or higher, has [facility] therapist or provider been notified? No    Any current suicidal ideation? No  If yes, has [facility] therapist or provider been notified? N/A    Primary care provider: Saba Hickman MD     Mental health provider: denies(follow up on MH referral if needed)    Insurance needs: active    Housing needs: stable    Contact information up to date? yes    3rd Party Involvement not today (please obtain YOBANY if pt would like to include)    Shruthi Barkley MA  July 6, 2023  3:21 PM

## 2023-07-06 NOTE — PROGRESS NOTES
"[facility] Follow Up    ASSESSMENT/PLAN                                                        1. Opioid use disorder, severe, dependence (H)  Controlled on suboxone 16 mg TDD. Staying in shelter which administers suboxone, but they had lost some of his medication. Has been taking 8 mg daily for the last few days.   -Continue suboxone without changes.   -Confirms narcan access.   -Encouraged plans to reschedule sublocade injection.   - Drugs of Abuse Screen Urine (POC CUPS) POCT  - buprenorphine HCl-naloxone HCl (SUBOXONE) 8-2 MG per film; Place 2 Film under the tongue 2 times daily  Dispense: 60 Film; Refill: 0         Return in about 1 month (around 8/6/2023) for Follow up, in person.  Patient counseling completed today:  Discussed mechanism of action, potential risks/benefits/side effects of medications and other recommendations above.    Reviewed directions for initiation of buprenorphine to reduce risk of precipitated withdrawal and maximize efficacy.    Harm reduction counseling including never use alone, availability of naloxone, risks associated with concurrent use of opioids and benzodiazepines, alcohol, or other sedatives, safer administration as applicable.  Discussed importance of avoiding isolation, building a network of supportive relationships, avoiding people/places/things associated with past use to reduce risk of relapse; including motivational interviewing regarding psychosocial treatment for addiction.   SUBJECTIVE                                                      CC/HPI:  Edward Glover is a 33 year old male with PMH , and opioid use disorder who presents to the Recovery Clinic for return visit.       Brief History:  Edward Glover was first seen in Recovery Clinic on 05/17/23.  Patient's reasons for seeking treatment on this date include to start suboxone.     Substance Use History :  Opioids:   Age at first use: 29 did use \"lean\" [age]  Current use: " "substance: percocet 10 mg ; quantity 3- 6 pills daily; route: oral ; timing of last use: 5/12/23;      Reports that he first started using \"lean\" a codeine cough syrup as a teen. Then he started taking prescription pain medication 3 years ago following a double ear infection, then began buying buying \"real\" percocet off the streets. Uses daily when he can get them. States he will buy what he can getand stretch his supply last, taking 1-3 pills daily, but reports taking up to 6 pills daily. He last took percocet 10 mg 3 pills on 5/12/23. Has been reluctant to take buprenorphine in past stating \"I did not want to become addicted to something else\". Has made several attempts to quit on his own, but has been struggling to abstain from opioids. Describes the mental obsession associated with use of opioids making it difficult for him to abstain.   Started buprenorphine through  after initial visit.                   IV drug use: No   History of overdose: No  Previous residential or outpatient treatments for addiction : Yes for court  Previous medication treatments for addiction: No  Longest period of sobriety: 5/12/23 to present  Medical complications related to substance use: denies  Hepatitis C:  5/17/23 HCV ab nonreactive  HIV: 5/17/23 HIV ag/ab nonreactive     Other Addiction History:  Stimulants   Ecstasy as a child; h/o cocaine addiction, last use 2017  Sedatives/hypnotics/anxiolytics:   Xanax- age 25  Alcohol:   Rare   Nicotine:   vaping  Cannabis:   Occasionally   Hallucinogens/Dissociatives:   Occasionally  Eating disorder:  denies  Gambling:              denies        5/24/2023     1:00 PM 6/7/2023     2:00 PM 7/6/2023     3:00 PM   PHQ   PHQ-9 Total Score 1 1 0   Q9: Thoughts of better off dead/self-harm past 2 weeks Not at all Not at all Not at all           Most recent [facility] visit 6/7/2023  Pt states he has continued to take buprenorphine 16mg/day as prescribed.  No c/o opioid cravings.  " Experiencing side effects of constipation and dry mouth, and does not like the taste. Miralax helpful for dry mouth. Interested in transfer to Sublocade.       A/P last visit:  1. Opioid use disorder, severe, dependence (H)  Controlled.  Wants to transfer to Sublocade.   Continue SL buprenorphine 16mg/day for now  Sublocade #1 scheduled 6/26, contacted infusion center during visit.   Letter for work provided.   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   -has not been able to schedule his sublocade injection, needs to find time   -Living in a shelter, make him turn his prescription into them. They lost some of his prescription, so he has been taking 8 mg TDD.   -Denies cravings or urges.   Uses THC on occasion,  Tries not to mix with his suboxone.  -Wants to continue suboxone 16 mg until he gets his injection. Hoping to be able to schedule injection next week.    -Denies side effects. Occasional constipation.     Labs discussed with patient?  Yes      [place] Prescription Drug Monitoring Program Reviewed:  Yes  06/07/2023 06/07/2023   1  Suboxone 8 Mg-2 Mg Sl Film 60.00  30  [facility]  [id number]   [facility] (8808)  0/0  16.00 mg  Medicaid [place]    05/24/2023 05/24/2023   1  Suboxone 8 Mg-2 Mg Sl Film 30.00  15  Li [facility]              Medications:  cloNIDine (CATAPRES) 0.1 MG tablet, Take 1 tablet (0.1 mg) by mouth 3 times daily as needed (opiate withdrawal)  naloxone (NARCAN) 4 MG/0.1ML nasal spray, Spray 1 spray (4 mg) into one nostril alternating nostrils as needed for opioid reversal every 2-3 minutes until assistance arrives  ondansetron (ZOFRAN) 4 MG tablet, Take 1 tablet (4 mg) by mouth every 8 hours as needed for nausea (Patient not taking: Reported on 5/2/2023)  polyethylene glycol (MIRALAX) 17 GM/Dose  powder, Take 17 g (1 capful.) by mouth daily  traZODone (DESYREL) 50 MG tablet, Take 1 tablet (50 mg) by mouth At Bedtime    No current facility-administered medications on file prior to visit.      Allergies   Allergen Reactions     Cephalosporins Unknown     Amoxicillin Rash and Unknown       PMH, PSH, FamHx reviewed      OBJECTIVE                                                      /83   Pulse 69     Physical Exam  HENT:      Head: Normocephalic.      Nose: Nose normal.   Eyes:      Conjunctiva/sclera: Conjunctivae normal.   Cardiovascular:      Rate and Rhythm: Normal rate.   Pulmonary:      Effort: Pulmonary effort is normal.   Neurological:      General: No focal deficit present.      Mental Status: He is alert and oriented to person, place, and time.   Psychiatric:         Attention and Perception: Attention normal.         Mood and Affect: Mood normal.         Speech: Speech normal.         Behavior: Behavior is cooperative.         Thought Content: Thought content normal.         Judgment: Judgment normal.         Labs:    UDS:    Lab Results   Component Value Date    BUP Screen Positive (A) 07/06/2023    BZO Negative 07/06/2023    BAR Negative 07/06/2023    FERNANDA Negative 07/06/2023    MAMP Negative 07/06/2023    AMP Negative 07/06/2023    MDMA Negative 07/06/2023    MTD Negative 07/06/2023    MJE993 Negative 07/06/2023    OXY Negative 07/06/2023    PCP Negative 07/06/2023    THC Screen Positive (A) 07/06/2023    TEMP 94 F 07/06/2023    SGPOCT >=1.030 (A) 07/06/2023     *POC urine drug screen does not screen for Fentanyl      Recent Results (from the past 240 hour(s))   Drugs of Abuse Screen Urine (POC CUPS) POCT    Collection Time: 07/06/23  3:26 PM   Result Value Ref Range    POCT Kit Lot Number [id number]     POCT Kit Expiration Date 93212651     Temperature Urine POCT 94 F 90 F, 92 F, 94 F, 96 F, 98 F, 100 F    Specific Gravity POCT >=1.030 (A) 1.005, 1.015, 1.025    pH Qual Urine POCT 5 pH 4 pH,  5 pH, 7 pH, 9 pH    Creatinine Qual Urine POCT 100 mg/dL 20 mg/dL, 50 mg/dL, 100 mg/dL, 200 mg/dL    Internal QC Qual Urine POCT Valid Valid    Amphetamine Qual Urine POCT Negative Negative    Barbiturate Qual Urine POCT Negative Negative    Buprenorphine Qual Urine POCT Screen Positive (A) Negative    Benzodiazepine Qual Urine POCT Negative Negative    Cocaine Qual Urine POCT Negative Negative    Methamphetamine Qual Urine POCT Negative Negative    MDMA Qual Urine POCT Negative Negative    Methadone Qual Urine POCT Negative Negative    Opiate Qual Urine POCT Negative Negative    Oxycodone Qual Urine POCT Negative Negative    Phencyclidine Qual Urine POCT Negative Negative    THC Qual Urine POCT Screen Positive (A) Negative           At least 30 min spent on day of encounter in review of medical record,  review, obtaining histories, discussing recommendations, counseling/coordination of care    Madie Major, [facility] [place] [facility]  [address] [place] [address] [id number] [phone]

## 2023-07-07 ENCOUNTER — TELEPHONE (OUTPATIENT)
Dept: ADDICTION MEDICINE | Facility: CLINIC | Age: 33
End: 2023-07-07
Payer: COMMERCIAL

## 2023-07-07 DIAGNOSIS — F11.20 OPIOID USE DISORDER, SEVERE, DEPENDENCE (H): ICD-10-CM

## 2023-07-07 RX ORDER — BUPRENORPHINE AND NALOXONE 8; 2 MG/1; MG/1
2 FILM, SOLUBLE BUCCAL; SUBLINGUAL DAILY
Qty: 60 FILM | Refills: 0 | Status: SHIPPED | OUTPATIENT
Start: 2023-07-07 | End: 2023-08-04

## 2023-07-07 NOTE — TELEPHONE ENCOUNTER
rx sent  1. Opioid use disorder, severe, dependence (H)    - buprenorphine HCl-naloxone HCl (SUBOXONE) 8-2 MG per film; Place 2 Film under the tongue daily  Dispense: 60 Film; Refill: 0

## 2023-07-07 NOTE — TELEPHONE ENCOUNTER
Received a PA request for buprenorphine HCl-naloxone HCl (SUBOXONE) 8-2 MG per films from [facility].     RN called pharmacy and they state the order for 4 films per day was not covered by insurance. They suggested reordering using a different strength.     RN reviewed chart notes and script was possibly suppose to be 8 mg BID (total of 16 mg).     Routing to providers for review high priority.     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   -has not been able to schedule his sublocade injection, needs to find time   -Living in a shelter, make him turn his prescription into them. They lost some of his prescription, so he has been taking 8 mg TDD.   -Denies cravings or urges.   Uses THC on occasion,  Tries not to mix with his suboxone.  -Wants to continue suboxone 16 mg until he gets his injection. Hoping to be able to schedule injection next week.    -Denies side effects. Occasional constipation.     ASSESSMENT/PLAN                                                          1. Opioid use disorder, severe, dependence (H)  Controlled on suboxone 16 mg TDD. Staying in shelter which administers suboxone, but they had lost some of his medication. Has been taking 8 mg daily for the last few days.   -Continue suboxone without changes.   -Confirms narcan access.   -Encouraged plans to reschedule sublocade injection.   - Drugs of Abuse Screen Urine (POC CUPS) POCT  - buprenorphine HCl-naloxone HCl (SUBOXONE) 8-2 MG per film; Place 2 Film under the tongue 2 times daily  Dispense: 60 Film; Refill: 0                 Return in about 1 month (around 8/6/2023) for Follow up, in person.  Patient counseling completed today:

## 2023-07-08 ENCOUNTER — NURSE TRIAGE (OUTPATIENT)
Dept: NURSING | Facility: CLINIC | Age: 33
End: 2023-07-08
Payer: COMMERCIAL

## 2023-07-08 NOTE — TELEPHONE ENCOUNTER
Nurse Triage SBAR    Is this a 2nd Level Triage? NO    Situation: Patient calling regarding suboxone script- Addiction medicine  Consent: not needed    Background: Addiction medicine clinic    Assessment:   Disp Refills Start End SLOAN    buprenorphine HCl-naloxone HCl (SUBOXONE) 8-2 MG per film 60 Film 0 7/7/2023  No   Sig - Route: Place 2 Film under the tongue daily - Sublingual   Sent to pharmacy as: Buprenorphine HCl-Naloxone HCl 8-2 MG Sublingual Film (Suboxone)   Class: E-Prescribe   Order: [id number]   E-Prescribing Status: Receipt confirmed by pharmacy (7/7/2023  5:08 PM CDT)       Protocol Recommended Disposition:       Recommendation: Advised that the corrected script was sent to his pharmacy yesterday at 5 pm- patient verbalizes understanding and will go and  his script. Declines additional questions     Follow up with pharmacy    Does the patient meet one of the following criteria for ADS visit consideration? No      Meredith Aquino RN 2:35 PM 7/8/2023  Reason for Disposition   Caller has medicine question only, adult not sick, AND triager answers question    Additional Information   Negative: [1] Intentional drug overdose AND [2] suicidal thoughts or ideas   Negative: Drug overdose and triager unable to answer question   Negative: Caller requesting a renewal or refill of a medicine patient is currently taking   Negative: Caller requesting information unrelated to medicine   Negative: Caller requesting information about COVID-19 Vaccine   Negative: Caller requesting information about Emergency Contraception   Negative: Caller requesting information about Combined Birth Control Pills   Negative: Caller requesting information about Progestin Birth Control Pills   Negative: Caller requesting information about Post-Op pain or medicines   Negative: Caller requesting a prescription antibiotic (such as Penicillin) for Strep throat and has a positive culture result   Negative: Caller requesting a  prescription anti-viral med (such as Tamiflu) and has influenza (flu) symptoms   Negative: Immunization reaction suspected   Negative: Rash while taking a medicine or within 3 days of stopping it   Negative: [1] Asthma and [2] having symptoms of asthma (cough, wheezing, etc.)   Negative: [1] Symptom of illness (e.g., headache, abdominal pain, earache, vomiting) AND [2] more than mild   Negative: Breastfeeding questions about mother's medicines and diet   Negative: MORE THAN A DOUBLE DOSE of a prescription or over-the-counter (OTC) drug   Negative: [1] DOUBLE DOSE (an extra dose or lesser amount) of prescription drug AND [2] any symptoms (e.g., dizziness, nausea, pain, sleepiness)   Negative: [1] DOUBLE DOSE (an extra dose or lesser amount) of over-the-counter (OTC) drug AND [2] any symptoms (e.g., dizziness, nausea, pain, sleepiness)   Negative: Took another person's prescription drug   Negative: [1] DOUBLE DOSE (an extra dose or lesser amount) of prescription drug AND [2] NO symptoms (Exception: a double dose of antibiotics)   Negative: Diabetes drug error or overdose (e.g., took wrong type of insulin or took extra dose)   Negative: [1] Prescription not at pharmacy AND [2] was prescribed by PCP recently (Exception: triager has access to EMR and prescription is recorded there. Go to Home Care and confirm for pharmacy.)   Negative: [1] Pharmacy calling with prescription question AND [2] triager unable to answer question   Negative: [1] Caller has URGENT medicine question about med that PCP or specialist prescribed AND [2] triager unable to answer question   Negative: Medicine patch causing local rash or itching   Negative: [1] Caller has medicine question about med NOT prescribed by PCP AND [2] triager unable to answer question (e.g., compatibility with other med, storage)   Negative: Prescription request for new medicine (not a refill)   Negative: [1] Caller has NON-URGENT medicine question about med that PCP  prescribed AND [2] triager unable to answer question   Negative: Caller wants to use a complementary or alternative medicine   Negative: [1] Prescription prescribed recently is not at pharmacy AND [2] triager has access to patient's EMR AND [3] prescription is recorded in the EMR   Negative: [1] DOUBLE DOSE (an extra dose or lesser amount) of over-the-counter (OTC) drug AND [2] NO symptoms   Negative: [1] DOUBLE DOSE (an extra dose or lesser amount) of antibiotic drug AND [2] NO symptoms    Protocols used: Medication Question Call-A-AH

## 2023-08-04 ENCOUNTER — OFFICE VISIT (OUTPATIENT)
Dept: BEHAVIORAL HEALTH | Facility: CLINIC | Age: 33
End: 2023-08-04
Payer: COMMERCIAL

## 2023-08-04 VITALS
SYSTOLIC BLOOD PRESSURE: 119 MMHG | BODY MASS INDEX: 29.97 KG/M2 | DIASTOLIC BLOOD PRESSURE: 81 MMHG | HEART RATE: 89 BPM | WEIGHT: 221 LBS

## 2023-08-04 DIAGNOSIS — F11.20 OPIOID USE DISORDER, SEVERE, DEPENDENCE (H): ICD-10-CM

## 2023-08-04 PROCEDURE — 80305 DRUG TEST PRSMV DIR OPT OBS: CPT | Performed by: FAMILY MEDICINE

## 2023-08-04 PROCEDURE — 99213 OFFICE O/P EST LOW 20 MIN: CPT | Performed by: FAMILY MEDICINE

## 2023-08-04 RX ORDER — BUPRENORPHINE AND NALOXONE 8; 2 MG/1; MG/1
1 FILM, SOLUBLE BUCCAL; SUBLINGUAL 2 TIMES DAILY
Qty: 60 FILM | Refills: 0 | Status: SHIPPED | OUTPATIENT
Start: 2023-08-04 | End: 2023-09-07

## 2023-08-04 ASSESSMENT — PAIN SCALES - GENERAL: PAINLEVEL: NO PAIN (0)

## 2023-08-04 ASSESSMENT — PATIENT HEALTH QUESTIONNAIRE - PHQ9: SUM OF ALL RESPONSES TO PHQ QUESTIONS 1-9: 0

## 2023-08-04 NOTE — PROGRESS NOTES
[facility] Follow Up    ASSESSMENT/PLAN                                                      1. Opioid use disorder, severe, dependence (H)  Reporting symptoms are well controlled. He would like to proceed with Sublocade which was approved.  We were not able to get his appointment for first injection scheduled today so I provided him with phone number to call.  Reviewed discontinuing SL buprenorphine 24 hours after first injection and importance of follow-up within 1 week after injection.  In the meantime, he will continue Suboxone 8-2mg BID.  Has Narcan.  Normal LFTs in May 2023, monitor while on Sublocade.    - Drugs of Abuse Screen Urine (POC CUPS) POCT  - buprenorphine HCl-naloxone HCl (SUBOXONE) 8-2 MG per film; Place 1 Film under the tongue 2 times daily  Dispense: 60 Film; Refill: 0         Return in about 4 weeks (around 9/1/2023) for Follow up, in person. Or 1 week after injection, whichever is sooner.    Patient counseling completed today:  Discussed mechanism of action, potential risks/benefits/side effects of medications and other recommendations above.    Reviewed directions for initiation of buprenorphine to reduce risk of precipitated withdrawal and maximize efficacy.    Harm reduction counseling including never use alone, availability of naloxone, risks associated with concurrent use of opioids and benzodiazepines, alcohol, or other sedatives, safer administration as applicable.  Discussed importance of avoiding isolation, building a network of supportive relationships, avoiding people/places/things associated with past use to reduce risk of relapse; including motivational interviewing regarding psychosocial treatment for addiction.   SUBJECTIVE                                                          CC/HPI:  Edward Glover is a 33 year old male with PMH , and opioid use disorder who presents to the [facility] for return visit.       Brief History:  Edward ALFREDO  "Adalberto was first seen in Recovery Clinic on 05/17/23.  Patient's reasons for seeking treatment on this date include to start suboxone.     Substance Use History :  Opioids:   Age at first use: 29 did use \"lean\" [age]  Current use: substance: percocet 10 mg ; quantity 3- 6 pills daily; route: oral ; timing of last use: 5/12/23;      Reports that he first started using \"lean\" a codeine cough syrup as a teen. Then he started taking prescription pain medication 3 years ago following a double ear infection, then began buying buying \"real\" percocet off the streets. Uses daily when he can get them. States he will buy what he can getand stretch his supply last, taking 1-3 pills daily, but reports taking up to 6 pills daily. He last took percocet 10 mg 3 pills on 5/12/23. Has been reluctant to take buprenorphine in past stating \"I did not want to become addicted to something else\". Has made several attempts to quit on his own, but has been struggling to abstain from opioids. Describes the mental obsession associated with use of opioids making it difficult for him to abstain.   Started buprenorphine through  after initial visit.     IV drug use: No   History of overdose: No  Previous residential or outpatient treatments for addiction : Yes for court  Previous medication treatments for addiction: No  Longest period of sobriety: 5/12/23 to present  Medical complications related to substance use: denies  Hepatitis C:  5/17/23 HCV ab nonreactive  HIV: 5/17/23 HIV ag/ab nonreactive     Other Addiction History:  Stimulants   Ecstasy as a child; h/o cocaine addiction, last use 2017  Sedatives/hypnotics/anxiolytics:   Xanax- age 25  Alcohol:   Rare   Nicotine:   vaping  Cannabis:   Occasionally   Hallucinogens/Dissociatives:   Occasionally  Eating disorder:  denies  Gambling:              denies    PAST PSYCHIATRIC HISTORY:  Diagnoses- PTSD  Suicide Attempts: Yes attempted to OD w/ cocaine 2017, entered treatment after "   Hospitalizations: No         6/7/2023     2:00 PM 7/6/2023     3:00 PM 8/4/2023     9:00 AM   PHQ   PHQ-9 Total Score 1 0 0   Q9: Thoughts of better off dead/self-harm past 2 weeks Not at all Not at all Not at all       Social History  Housing status: alone  Employment status: Employed full time  Relationship status: Single  Children: 6 children  Legal: denies      Most recent [facility] visit 8/4/23.    A/P last visit:  1. Opioid use disorder, severe, dependence (H)  Controlled on suboxone 16 mg TDD. Staying in shelter which administers suboxone, but they had lost some of his medication. Has been taking 8 mg daily for the last few days.   -Continue suboxone without changes.   -Confirms narcan access.   -Encouraged plans to reschedule sublocade injection.   - Drugs of Abuse Screen Urine (POC CUPS) POCT  - buprenorphine HCl-naloxone HCl (SUBOXONE) 8-2 MG per film; Place 2 Film under the tongue 2 times daily  Dispense: 60 Film; Refill: 0    08/04/23 visit:  No opioid use for over 1 year!    Taking Suboxone 8-2mg BID  No side effects  Mood is up and down, overall improving  Would like Sublocade  No other concerns      Labs discussed with patient?  Yes      [place] Prescription Drug Monitoring Program Reviewed:  Yes; as expected    Medications:  cloNIDine (CATAPRES) 0.1 MG tablet, Take 1 tablet (0.1 mg) by mouth 3 times daily as needed (opiate withdrawal) (Patient not taking: Reported on 8/4/2023)  naloxone (NARCAN) 4 MG/0.1ML nasal spray, Spray 1 spray (4 mg) into one nostril alternating nostrils as needed for opioid reversal every 2-3 minutes until assistance arrives  ondansetron (ZOFRAN) 4 MG tablet, Take 1 tablet (4 mg) by mouth every 8 hours as needed for nausea (Patient not taking: Reported on 5/2/2023)  polyethylene glycol (MIRALAX) 17 GM/Dose powder, Take 17 g (1 capful.) by mouth daily (Patient not taking: Reported on 8/4/2023)  traZODone (DESYREL) 50 MG tablet, Take 1 tablet (50 mg) by mouth At  Bedtime (Patient not taking: Reported on 8/4/2023)    No current facility-administered medications on file prior to visit.      Allergies   Allergen Reactions    Cephalosporins Unknown    Amoxicillin Rash and Unknown       PMH, PSH, FamHx reviewed      OBJECTIVE                                                      /81 (BP Location: Left arm, Patient Position: Sitting, Cuff Size: Adult Large)   Pulse 89   Wt 100.2 kg (221 lb)   BMI 29.97 kg/m      Physical Exam  Vitals and nursing note reviewed.   Constitutional:       General: He is not in acute distress.     Appearance: Normal appearance. He is not ill-appearing or diaphoretic.   Eyes:      General: No scleral icterus.  Cardiovascular:      Rate and Rhythm: Normal rate.   Pulmonary:      Effort: Pulmonary effort is normal. No respiratory distress.   Skin:     General: Skin is warm and dry.      Coloration: Skin is not jaundiced or pale.   Neurological:      General: No focal deficit present.      Mental Status: He is alert and oriented to person, place, and time.      Gait: Gait normal.   Psychiatric:         Mood and Affect: Mood normal.         Behavior: Behavior normal.         Thought Content: Thought content normal.         Judgment: Judgment normal.         Labs:    UDS:    Lab Results   Component Value Date    BUP Screen Positive (A) 08/04/2023    BZO Negative 08/04/2023    BAR Negative 08/04/2023    FERNANDA Negative 08/04/2023    MAMP Negative 08/04/2023    AMP Negative 08/04/2023    MDMA Negative 08/04/2023    MTD Negative 08/04/2023    EVI163 Negative 08/04/2023    OXY Negative 08/04/2023    PCP Negative 08/04/2023    THC Screen Positive (A) 08/04/2023    TEMP 92 F 08/04/2023    SGPOCT 1.025 08/04/2023     *POC urine drug screen does not screen for Fentanyl      Recent Results (from the past 240 hour(s))   Drugs of Abuse Screen Urine (POC CUPS) POCT    Collection Time: 08/04/23  9:14 AM   Result Value Ref Range    POCT Kit Lot Number [id number]      POCT Kit Expiration Date 2024-11-20     Temperature Urine POCT 92 F 90 F, 92 F, 94 F, 96 F, 98 F, 100 F    Specific [place] POCT 1.025 1.005, 1.015, 1.025    pH Qual Urine POCT 5 pH 4 pH, 5 pH, 7 pH, 9 pH    Creatinine Qual Urine POCT 100 mg/dL 20 mg/dL, 50 mg/dL, 100 mg/dL, 200 mg/dL    Internal QC Qual Urine POCT Valid Valid    Amphetamine Qual Urine POCT Negative Negative    Barbiturate Qual Urine POCT Negative Negative    Buprenorphine Qual Urine POCT Screen Positive (A) Negative    Benzodiazepine Qual Urine POCT Negative Negative    Cocaine Qual Urine POCT Negative Negative    Methamphetamine Qual Urine POCT Negative Negative    MDMA Qual Urine POCT Negative Negative    Methadone Qual Urine POCT Negative Negative    Opiate Qual Urine POCT Negative Negative    Oxycodone Qual Urine POCT Negative Negative    Phencyclidine Qual Urine POCT Negative Negative    THC Qual Urine POCT Screen Positive (A) Negative         Shruthi Alfaro DO  [facility]  [address] [place] [id number] [phone]

## 2023-08-04 NOTE — PROGRESS NOTES
[facility]      Rooming information:  Approximate last use of full opioid agonist: At least one year.   PA for Subocade approved  Taking buprenorphine? Yes:  As prescribed? Yes:   Number of buprenorphine films/tablets remaining currently: None  Side effects related to buprenorphine (constipation, dry mouth, sedation?) No   Narcan currently available: Yes  Other recent substance use:    Cannabis   NICOTINE-No  If using nicotine, ready to quit? NA    Point of care urine drug screen positive for:  Lab Results   Component Value Date    BUP Screen Positive (A) 08/04/2023    BZO Negative 08/04/2023    BAR Negative 08/04/2023    FERNANDA Negative 08/04/2023    MAMP Negative 08/04/2023    AMP Negative 08/04/2023    MDMA Negative 08/04/2023    MTD Negative 08/04/2023    TPN900 Negative 08/04/2023    OXY Negative 08/04/2023    PCP Negative 08/04/2023    THC Screen Positive (A) 08/04/2023    TEMP 92 F 08/04/2023    SGPOCT 1.025 08/04/2023       *POC urine drug screen does not screen for Fentanyl            8/4/2023     9:00 AM   PHQ Assesment Total Score(s)   PHQ-9 Score 0       If PHQ-9 score of 15 or higher, has Recovery Clinic therapist or provider been notified? No    Any current suicidal ideation? No  If yes, has [facility] therapist or provider been notified? N/A    Primary care provider: Saba Hickman MD     Mental health provider: denies (follow up on MH referral if needed)    Insurance needs: active    Housing needs: stable    Contact information up to date? yes    3rd Party Involvement NA (please obtain YOBANY if pt would like to include)    Gayatri Murry LPN  August 4, 2023  9:09 AM

## 2023-08-04 NOTE — PATIENT INSTRUCTIONS
Call to schedule Sublocade.  Follow-up in clinic in 4 weeks or 1 week after your first injection whichever is sooner.      [facility] (2nd Floor)  [address], [address] [place] [address]  Phone: [phone]     On the day of your first Sublocade injection, take your usual dose of sublingual buprenorphine (Suboxone.)      24 hours after first Sublocade, stop taking Suboxone on a daily schedule.      If you develop withdrawal symptoms or cravings a few days after Sublocade, you can take 4-8mg sublingual buprenorphine daily as needed only.   As you continue to receive additional Sublocade injections, your need for additional buprenorphine will decrease eventually to none.      The skin over the Sublocade injection may become slightly itchy and red for 3-4 days.  If needed, you can use over the counter hydrocortisone 1% cream to the area or take an over the counter antihistamine, like Zyrtec, Claritin, Allegra, or Benadryl to help reduce the itching.     Schedule an appointment with your provider about one week after your first Sublocade to review your symptoms and treatment.   Please contact your provider's office if you have additional questions or problems.

## 2023-09-05 ENCOUNTER — TELEPHONE (OUTPATIENT)
Dept: BEHAVIORAL HEALTH | Facility: CLINIC | Age: 33
End: 2023-09-05
Payer: COMMERCIAL

## 2023-09-05 NOTE — TELEPHONE ENCOUNTER
Pt missed Friday because of transportation issues.  He ran out of medicine but has difficulty walking in because of work.  He's going to try and get off a little early tomorrow and make it to the clinic before the door closes at 3 pm.

## 2023-09-07 DIAGNOSIS — F11.20 OPIOID USE DISORDER, SEVERE, DEPENDENCE (H): ICD-10-CM

## 2023-09-07 RX ORDER — BUPRENORPHINE AND NALOXONE 8; 2 MG/1; MG/1
1 FILM, SOLUBLE BUCCAL; SUBLINGUAL 2 TIMES DAILY
Qty: 18 FILM | Refills: 0 | Status: SHIPPED | OUTPATIENT
Start: 2023-09-07 | End: 2023-09-07

## 2023-09-07 NOTE — TELEPHONE ENCOUNTER
Reason for call:  Other   Patient called regarding (reason for call): appointment  Additional comments: patient is requesting a double -book apt on Friday at 330 p.m patient is experiencing nausea. Patient gets off at 3 p.m daily and can't leave early due to being on probation at work. Please let me know if I can double book him for 330 on Friday or can nurse send bridge until his next scheduled apt     Phone number to reach patient:  Home number on file [phone] (home)    Best Time:  any    Can we leave a detailed message on this number?  YES    Travel screening: Negative

## 2023-09-07 NOTE — TELEPHONE ENCOUNTER
Incoming call from patient regarding Suboxone bridge request, writer informed patient that the bridge request was approved and a follow-up appointment has been scheduled for 09/15/2023 @3:30 pm. Patient recommended a change in pharmacy to [address], [place] [address] [phone].     Pended new pharmacy, routing to provider to further assist.     Rashida Ordaz RN on 9/7/2023 at 3:58 PM

## 2023-09-07 NOTE — TELEPHONE ENCOUNTER
Writer attempted to contact patient, no answer; left VM message asking for return call to the Recovery Clinic. Writer scheduled patient an appointment for follow-up in the Recovery Clinic on 09/15/2023 @3:30 pm and will request a bridge of Suboxone from the provider to get patient through to that appointment.     Date of Last Office Visit: 08/04/2023  Date of Next Office Visit: 09/15/2023  No shows since last visit: 09/01/2023  Cancellations since last visit: None    Medication requested:   buprenorphine HCl-naloxone HCl (SUBOXONE) 8-2 MG per film 60 Film 0 8/4/2023  No   Sig - Route: Place 1 Film under the tongue 2 times daily - Sublingual    Date last ordered: 08/04/2023 Qty: 60 Refills: 0     Review of [place] ?: Yes  Medication last sold date: 08/04/2023 Qty filled: 60  Other controlled substance on [place] ?: No    Lapse in medication adherence greater than 5 days?: Unknown  If yes, call patient and gather details: See note above  Medication refill request verified as identical to current order?: No, due to bridge request  Result of Last DAM, VPA, Li+ Level, CBC, or Carbamazepine Level (at or since last visit): N/A    Last visit treatment plan:     1. Opioid use disorder, severe, dependence (H)  Reporting symptoms are well controlled. He would like to proceed with Sublocade which was approved.  We were not able to get his appointment for first injection scheduled today so I provided him with phone number to call.  Reviewed discontinuing SL buprenorphine 24 hours after first injection and importance of follow-up within 1 week after injection.  In the meantime, he will continue Suboxone 8-2mg BID.  Has Narcan.  Normal LFTs in May 2023, monitor while on Sublocade.    - Drugs of Abuse Screen Urine (POC CUPS) POCT  - buprenorphine HCl-naloxone HCl (SUBOXONE) 8-2 MG per film; Place 1 Film under the tongue 2 times daily  Dispense: 60 Film; Refill: 0                      Return in about 4 weeks (around 9/1/2023) for Follow  up, in person. Or 1 week after injection, whichever is sooner.      []Medication refilled per  Medication Refill in Ambulatory Care  policy.  [x]Medication unable to be refilled by RN due to criteria not met as indicated below:    []Eligibility - not seen in the last year   []Supervision - no future appointment   []Compliance - no shows, cancellations or lapse in therapy   []Verification - order discrepancy   [x]Controlled medication   []Medication not included in policy   []90-day supply request   []Other

## 2023-09-08 RX ORDER — BUPRENORPHINE AND NALOXONE 8; 2 MG/1; MG/1
1 FILM, SOLUBLE BUCCAL; SUBLINGUAL 2 TIMES DAILY
Qty: 18 FILM | Refills: 0 | Status: SHIPPED | OUTPATIENT
Start: 2023-09-08 | End: 2023-09-21

## 2023-09-08 NOTE — TELEPHONE ENCOUNTER
RN called the previous [facility] and cancelled buprenorphine HCl-naloxone HCl (SUBOXONE) 8-2 MG per film script.     Routing request to  provider high priority as patient is likely out of medication.     Per , script was filled on 8/4/2023 for a 30-day supply.    Diana Rodríguez RN on 9/8/2023 at 10:30 AM

## 2023-09-08 NOTE — TELEPHONE ENCOUNTER
Larissa sent to [facility]'s location as requested.    Shruthi Alfaro DO on 9/8/2023 at 10:56 AM

## 2023-09-21 ENCOUNTER — OFFICE VISIT (OUTPATIENT)
Dept: BEHAVIORAL HEALTH | Facility: CLINIC | Age: 33
End: 2023-09-21
Payer: COMMERCIAL

## 2023-09-21 ENCOUNTER — TELEPHONE (OUTPATIENT)
Dept: BEHAVIORAL HEALTH | Facility: CLINIC | Age: 33
End: 2023-09-21

## 2023-09-21 DIAGNOSIS — F11.20 OPIOID USE DISORDER, SEVERE, DEPENDENCE (H): ICD-10-CM

## 2023-09-21 LAB
AMPHETAMINE QUAL URINE POCT: NEGATIVE
BARBITURATE QUAL URINE POCT: NEGATIVE
BENZODIAZEPINE QUAL URINE POCT: NEGATIVE
BUPRENORPHINE QUAL URINE POCT: NEGATIVE
COCAINE QUAL URINE POCT: NEGATIVE
CREATININE QUAL URINE POCT: ABNORMAL
FENTANYL UR QL: NORMAL
INTERNAL QC QUAL URINE POCT: ABNORMAL
MDMA QUAL URINE POCT: NEGATIVE
METHADONE QUAL URINE POCT: NEGATIVE
METHAMPHETAMINE QUAL URINE POCT: NEGATIVE
OPIATE QUAL URINE POCT: NEGATIVE
OXYCODONE QUAL URINE POCT: NEGATIVE
PH QUAL URINE POCT: ABNORMAL
PHENCYCLIDINE QUAL URINE POCT: NEGATIVE
POCT KIT EXPIRATION DATE: ABNORMAL
POCT KIT LOT NUMBER: ABNORMAL
SPECIFIC GRAVITY POCT: 1.01
TEMPERATURE URINE POCT: ABNORMAL
THC QUAL URINE POCT: ABNORMAL

## 2023-09-21 PROCEDURE — 99214 OFFICE O/P EST MOD 30 MIN: CPT | Performed by: FAMILY MEDICINE

## 2023-09-21 PROCEDURE — 80307 DRUG TEST PRSMV CHEM ANLYZR: CPT | Performed by: FAMILY MEDICINE

## 2023-09-21 RX ORDER — BUPRENORPHINE AND NALOXONE 8; 2 MG/1; MG/1
1 FILM, SOLUBLE BUCCAL; SUBLINGUAL 2 TIMES DAILY
Qty: 60 FILM | Refills: 0 | Status: SHIPPED | OUTPATIENT
Start: 2023-09-21 | End: 2023-10-17

## 2023-09-21 ASSESSMENT — PATIENT HEALTH QUESTIONNAIRE - PHQ9: SUM OF ALL RESPONSES TO PHQ QUESTIONS 1-9: 9

## 2023-09-21 NOTE — PROGRESS NOTES
[facility] Follow Up    ASSESSMENT/PLAN                                                    1. Opioid use disorder, severe, dependence (H)  Off buprenorphine x4+ days, denies return to use, wants to resume previous dose until Sublocade which he scheduled for next month.   Resume buprenorphine 16mg/day  - Drugs of Abuse Screen Urine (POC CUPS) POCT  - Fentanyl Qualitative with Reflex to Quant Urine; Future  - Fentanyl Qualitative with Reflex to Quant Urine  - Drugs of Abuse Screen Urine (POC CUPS) POCT  - buprenorphine HCl-naloxone HCl (SUBOXONE) 8-2 MG per film; Place 1 Film under the tongue 2 times daily  Dispense: 60 Film; Refill: 0       Return in 29 days (on 10/20/2023) for Follow up, in person before Sublocade at 3:30p.     Patient counseling completed today:  Discussed mechanism of action, potential risks/benefits/side effects of medications and other recommendations above.      Harm reduction counseling including never use alone, availability of naloxone, risks associated with concurrent use of opioids and benzodiazepines, alcohol, or other sedatives, safer administration as applicable.  Discussed importance of avoiding isolation, building a network of supportive relationships, avoiding people/places/things associated with past use to reduce risk of relapse; including motivational interviewing regarding psychosocial treatment for addiction.   SUBJECTIVE                                                          CC/HPI:  Edward Glover is a 33 year old male with PMH , and opioid use disorder who presents to the [facility] for return visit.       Brief History:  Edward Glover was first seen in Recovery Clinic on 05/17/23 interested in starting buprenorphine.     Substance Use History :  Opioids:   Age at first use: [age] used codeine, oxycodone started age 29  Current use: substance: percocet 10 mg ; quantity 3- 6 pills daily; route: oral ; timing of last use: 5/12/23    "  Reports that he first started using \"lean\" a codeine cough syrup as a teen. Then he started taking prescription pain medication 3 years ago following a double ear infection, then began buying buying \"real\" percocet off the streets. Uses daily when he can get them. States he will buy what he can getand stretch his supply last, taking 1-3 pills daily, but reports taking up to 6 pills daily. He last took percocet 10 mg 3 pills on 5/12/23. Has been reluctant to take buprenorphine in past stating \"I did not want to become addicted to something else\". Has made several attempts to quit on his own, but has been struggling to abstain from opioids.   Started buprenorphine through  after initial visit.     IV drug use: No   History of overdose: No  Previous residential or outpatient treatments for addiction : Yes for court  Previous medication treatments for addiction: No  Longest period of sobriety: 5/12/23 to present  Medical complications related to substance use: denies  Hepatitis C:  5/17/23 HCV ab nonreactive  HIV: 5/17/23 HIV ag/ab nonreactive     Other Addiction History:  Stimulants   Ecstasy as a child; h/o cocaine addiction, last use 2017  Sedatives/hypnotics/anxiolytics:   Xanax- age 25  Alcohol:   Rare   Nicotine:   vaping  Cannabis:   Occasionally   Hallucinogens/Dissociatives:   Occasionally  Eating disorder:  denies  Gambling:              denies    PAST PSYCHIATRIC HISTORY:  Diagnoses- PTSD  Suicide Attempts: Yes attempted to OD w/ cocaine 2017, entered treatment after   Hospitalizations: No         7/6/2023     3:00 PM 8/4/2023     9:00 AM 9/21/2023    10:00 AM   PHQ   PHQ-9 Total Score 0 0 9   Q9: Thoughts of better off dead/self-harm past 2 weeks Not at all Not at all Not at all       Social History  Housing status: alone  Employment status: Employed full time  Relationship status: Single  Children: 6 children  Legal: denies      Most recent [facility] visit 8/4/23.    A/P last visit:  1. Opioid use " disorder, severe, dependence (H)  Reporting symptoms are well controlled. He would like to proceed with Sublocade which was approved.  We were not able to get his appointment for first injection scheduled today so I provided him with phone number to call.  Reviewed discontinuing SL buprenorphine 24 hours after first injection and importance of follow-up within 1 week after injection.  In the meantime, he will continue Suboxone 8-2mg BID.  Has Narcan.  Normal LFTs in May 2023, monitor while on Sublocade.    - Drugs of Abuse Screen Urine (POC CUPS) POCT  - buprenorphine HCl-naloxone HCl (SUBOXONE) 8-2 MG per film; Place 1 Film under the tongue 2 times daily  Dispense: 60 Film; Refill: 0                 Return in about 4 weeks (around 9/1/2023) for Follow up, in person. Or 1 week after injection, whichever is sooner.      9/21/23 visit:  Pt states he last took buprenorphine ~9/17/23, was taking 8-16mg/day until then.  Denies return to use of full opioid agonists.  Expressing continued ambivalence about buprenorphine treatment, does not like the physical dependence.  Did not schedule Sublocade as planned, but states he still wants to transfer to Sublocade.  He would like to schedule injection for next month, citing many things going on in his personal life that would make it difficult for him to schedule until then.  He states he does intend to resume SL buprenorphine until he gets Sublocade.   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        Labs discussed with patient?  Yes      [place] Prescription Drug Monitoring Program Reviewed:  Yes; as expected  09/08/2023 09/08/2023 1 Suboxone 8 Mg-2 Mg Sl Film 18.00 9 Ka Par [id number] [facility] ([id number]) 0/0 16.00 mg Medicaid [place]   08/04/2023 08/04/2023 1 Suboxone 8 Mg-2 Mg Sl Film 60.00 30 Ka Par [id number] [facility] ([id number]) 0/0 16.00 mg Medicaid [place]     Medications:  buprenorphine HCl-naloxone HCl (SUBOXONE) 8-2 MG per film, Place 1 Film under the tongue 2 times  daily  cloNIDine (CATAPRES) 0.1 MG tablet, Take 1 tablet (0.1 mg) by mouth 3 times daily as needed (opiate withdrawal) (Patient not taking: Reported on 8/4/2023)  naloxone (NARCAN) 4 MG/0.1ML nasal spray, Spray 1 spray (4 mg) into one nostril alternating nostrils as needed for opioid reversal every 2-3 minutes until assistance arrives  ondansetron (ZOFRAN) 4 MG tablet, Take 1 tablet (4 mg) by mouth every 8 hours as needed for nausea (Patient not taking: Reported on 5/2/2023)  polyethylene glycol (MIRALAX) 17 GM/Dose powder, Take 17 g (1 capful.) by mouth daily (Patient not taking: Reported on 8/4/2023)  traZODone (DESYREL) 50 MG tablet, Take 1 tablet (50 mg) by mouth At Bedtime (Patient not taking: Reported on 8/4/2023)    No current facility-administered medications on file prior to visit.      Allergies   Allergen Reactions    Cephalosporins Unknown    Amoxicillin Rash and Unknown       PMH, PSH, FamHx reviewed      OBJECTIVE                                                      There were no vitals taken for this visit.    Physical Exam  Vitals and nursing note reviewed.   Constitutional:       General: He is not in acute distress.     Appearance: Normal appearance. He is not ill-appearing or diaphoretic.   Eyes:      General: No scleral icterus.  Cardiovascular:      Rate and Rhythm: Normal rate.   Pulmonary:      Effort: Pulmonary effort is normal. No respiratory distress.   Skin:     General: Skin is warm and dry.      Coloration: Skin is not jaundiced or pale.   Neurological:      General: No focal deficit present.      Mental Status: He is alert and oriented to person, place, and time.      Gait: Gait normal.   Psychiatric:         Mood and Affect: Mood normal.         Behavior: Behavior normal.         Thought Content: Thought content normal.         Judgment: Judgment normal.         Labs:    UDS:    Lab Results   Component Value Date    BUP Negative 09/21/2023    BZO Negative 09/21/2023    BAR Negative  09/21/2023    FERNANDA Negative 09/21/2023    MAMP Negative 09/21/2023    AMP Negative 09/21/2023    MDMA Negative 09/21/2023    MTD Negative 09/21/2023    [id number] Negative 09/21/2023    OXY Negative 09/21/2023    PCP Negative 09/21/2023    THC Screen Positive (A) 09/21/2023    TEMP 94 F 09/21/2023    SGPOCT 1.015 09/21/2023     *POC urine drug screen does not screen for Fentanyl      Recent Results (from the past 240 hour(s))   Drugs of Abuse Screen Urine (POC CUPS) POCT    Collection Time: 09/21/23 11:05 AM   Result Value Ref Range    POCT Kit Lot Number [id number]     POCT Kit Expiration Date [id number]     Temperature Urine POCT 94 F 90 F, 92 F, 94 F, 96 F, 98 F, 100 F    Specific [place] POCT 1.015 1.005, 1.015, 1.025    pH Qual Urine POCT 7 pH 4 pH, 5 pH, 7 pH, 9 pH    Creatinine Qual Urine POCT 100 mg/dL 20 mg/dL, 50 mg/dL, 100 mg/dL, 200 mg/dL    Internal QC Qual Urine POCT Valid Valid    Amphetamine Qual Urine POCT Negative Negative    Barbiturate Qual Urine POCT Negative Negative    Buprenorphine Qual Urine POCT Negative Negative    Benzodiazepine Qual Urine POCT Negative Negative    Cocaine Qual Urine POCT Negative Negative    Methamphetamine Qual Urine POCT Negative Negative    MDMA Qual Urine POCT Negative Negative    Methadone Qual Urine POCT Negative Negative    Opiate Qual Urine POCT Negative Negative    Oxycodone Qual Urine POCT Negative Negative    Phencyclidine Qual Urine POCT Negative Negative    THC Qual Urine POCT Screen Positive (A) Negative   Fentanyl Qualitative with Reflex to Quant Urine    Collection Time: 09/21/23 11:08 AM   Result Value Ref Range    Fentanyl Qual Urine Screen Negative Screen Negative       At least 30min spent in review of medical record,  review, obtaining histories, discussing recommendations, counseling.     Odalis Jama MD  Addiction Medicine  [place] [id number] [address] [address]  [phone]

## 2023-09-21 NOTE — TELEPHONE ENCOUNTER
Incoming call from patient reporting difficulty in filling his Suboxone prescription. Phone call to pharmacy. Prescription went through insurance without difficulty when ran as brand name.     Rashida Ordaz RN on 9/21/2023 at 1:41 PM

## 2023-09-21 NOTE — NURSING NOTE
[facility]  Havent had suboxone in like a week or so, looking for more natural way for sobriety    Rooming information:  Approximate last use of full opioid agonist: 7months, lean w/percocet  Taking buprenorphine? Yes:   How much per day? NA  Number of buprenorphine films/tablets remaining currently: 0  Side effects related to buprenorphine (constipation, dry mouth, sedation?) Yes:  some constipation  Narcan currently available: Yes  Other recent substance use:    Cannabis   NICOTINE-No      Point of care urine drug screen positive for:  Lab Results   Component Value Date    BUP Negative 09/21/2023    BZO Negative 09/21/2023    BAR Negative 09/21/2023    FERNANDA Negative 09/21/2023    MAMP Negative 09/21/2023    AMP Negative 09/21/2023    MDMA Negative 09/21/2023    MTD Negative 09/21/2023    [id number] Negative 09/21/2023    OXY Negative 09/21/2023    PCP Negative 09/21/2023    THC Screen Positive (A) 09/21/2023    TEMP 94 F 09/21/2023    SGPOCT 1.015 09/21/2023       *POC urine drug screen does not screen for Fentanyl        9/21/2023    10:00 AM   PHQ Assesment Total Score(s)   PHQ-9 Score 9       If PHQ-9 score of 15 or higher, has Recovery Clinic therapist or provider been notified? No    Any current suicidal ideation? No  If yes, has [facility] therapist or provider been notified? N/A    Primary care provider: Saba Hickman MD     Mental health provider: denies- pray to god (follow up on MH referral if needed)    Insurance needs: active    Housing needs: stable    Contact information up to date? yes    3rd Party Involvement not today (please obtain YOBANY if pt would like to include)    Shruthi Barkley MA  September 21, 2023  10:15 AM

## 2023-10-17 ENCOUNTER — OFFICE VISIT (OUTPATIENT)
Dept: BEHAVIORAL HEALTH | Facility: CLINIC | Age: 33
End: 2023-10-17
Payer: COMMERCIAL

## 2023-10-17 VITALS — HEART RATE: 58 BPM | SYSTOLIC BLOOD PRESSURE: 136 MMHG | DIASTOLIC BLOOD PRESSURE: 87 MMHG

## 2023-10-17 DIAGNOSIS — F11.20 OPIOID USE DISORDER, SEVERE, DEPENDENCE (H): Primary | ICD-10-CM

## 2023-10-17 PROCEDURE — 99213 OFFICE O/P EST LOW 20 MIN: CPT | Mod: GC | Performed by: FAMILY MEDICINE

## 2023-10-17 PROCEDURE — 80305 DRUG TEST PRSMV DIR OPT OBS: CPT | Performed by: FAMILY MEDICINE

## 2023-10-17 RX ORDER — BUPRENORPHINE AND NALOXONE 4; 1 MG/1; MG/1
1 FILM, SOLUBLE BUCCAL; SUBLINGUAL DAILY PRN
Qty: 30 FILM | Refills: 0 | Status: SHIPPED | OUTPATIENT
Start: 2023-10-17 | End: 2023-11-16

## 2023-10-17 RX ORDER — BUPRENORPHINE AND NALOXONE 8; 2 MG/1; MG/1
2 FILM, SOLUBLE BUCCAL; SUBLINGUAL DAILY
Qty: 60 FILM | Refills: 0 | Status: SHIPPED | OUTPATIENT
Start: 2023-10-17 | End: 2023-11-17

## 2023-10-17 ASSESSMENT — PATIENT HEALTH QUESTIONNAIRE - PHQ9: SUM OF ALL RESPONSES TO PHQ QUESTIONS 1-9: 0

## 2023-10-17 NOTE — PROGRESS NOTES
[facility] Follow Up    ASSESSMENT/PLAN                                                      Opioid use disorder, severe, dependence (H)  Overall stable in early remission, but with inconsistent use of Suboxone may be high risk for relapse. His goal is to not take any maintenance medication. I suggested that a way to achieve this would be to start Sublocade given it's longer acting and the withdrawal from it is expected to be less severe. We discussed that he would have risk of cravings even after getting through acute withdrawal. We discussed non-opioid medication options to treat withdrawal such as clonidine and hydroxyzine which he will read about. He prefers to use herbal supplements such as a poppy extract, so we discussed the risks and benefits of that. He endorsed taking ~20 mg Suboxone per day for possibly increased cravings, thus will prescribe 16 mg once daily + 4 mg PRN for cravings.  - Drugs of Abuse Screen Urine (POC CUPS) POCT  - buprenorphine HCl-naloxone HCl (SUBOXONE) 8-2 MG per film  Dispense: 60 Film; Refill: 0  - buprenorphine HCl-naloxone HCl (SUBOXONE) 4-1 MG per film  Dispense: 30 Film; Refill: 0     No follow-ups on file.     Patient counseling completed today:  Discussed mechanism of action, potential risks/benefits/side effects of medications and other recommendations above.      Harm reduction counseling including never use alone, availability of naloxone, risks associated with concurrent use of opioids and benzodiazepines, alcohol, or other sedatives, safer administration as applicable.  Discussed importance of avoiding isolation, building a network of supportive relationships, avoiding people/places/things associated with past use to reduce risk of relapse; including motivational interviewing regarding psychosocial treatment for addiction.   SUBJECTIVE                                                          CC/HPI:  Edward Glover is a 33 year old male with  "PMH , and opioid use disorder who presents to the [facility] for return visit.       Brief History:  Edward Golver was first seen in Recovery Clinic on 05/17/23 interested in starting buprenorphine.     Substance Use History :  Opioids:   Age at first use: [age] used codeine, oxycodone started age 29  Current use: substance: percocet 10 mg ; quantity 3- 6 pills daily; route: oral ; timing of last use: 5/12/23     Reports that he first started using \"lean\" a codeine cough syrup as a teen. Then he started taking prescription pain medication 3 years ago following a double ear infection, then began buying buying \"real\" percocet off the streets. Uses daily when he can get them. States he will buy what he can get and stretch his supply last, taking 1-3 pills daily, but reports taking up to 6 pills daily. He last took percocet 10 mg 3 pills on 5/12/23. Has been reluctant to take buprenorphine in past stating \"I did not want to become addicted to something else\". Has made several attempts to quit on his own, but has been struggling to abstain from opioids.   Started buprenorphine through  after initial visit.     IV drug use: No   History of overdose: No  Previous residential or outpatient treatments for addiction : Yes for court  Previous medication treatments for addiction: No  Longest period of sobriety: 5/12/23 to present  Medical complications related to substance use: denies  Hepatitis C:  5/17/23 HCV ab nonreactive  HIV: 5/17/23 HIV ag/ab nonreactive     Other Addiction History:  Stimulants   Ecstasy as a child; h/o cocaine addiction, last use 2017  Sedatives/hypnotics/anxiolytics:   Xanax- age 25  Alcohol:   Rare   Nicotine:   vaping  Cannabis:   Occasionally   Hallucinogens/Dissociatives:   Occasionally  Eating disorder:  denies  Gambling:              denies    PAST PSYCHIATRIC HISTORY:  Diagnoses- PTSD  Suicide Attempts: Yes attempted to OD w/ cocaine 2017, entered treatment after   Hospitalizations: " No         8/4/2023     9:00 AM 9/21/2023    10:00 AM 10/17/2023    11:00 AM   PHQ   PHQ-9 Total Score 0 9 0   Q9: Thoughts of better off dead/self-harm past 2 weeks Not at all Not at all Not at all       Social History  Housing status: alone  Employment status: Employed full time  Relationship status: Single  Children: 6 children  Legal: denies      Most recent [facility] visit 9/21/23. He had been interested in Sublocade which he called to schedule an appointment for this Friday. He has also expressed that he might prefer not to continue buprenorphine because he doesn't like the idea of being physically dependent on it.     10/17/23 visit:  Pt states he last took buprenorphine yesterday evening, and has been taking ~20 mg daily.  Denies use of full opioid agonists.     He is hoping to try out [place] poppy and blue jaimee to help with opioid withdrawal. He also looked into Kratom but does not plan to use that because he worries it would be addictive. He wants to avoid medications as much as possible in preference of 'natural' substances. His goal is to be off of medication in the next month. He notes his life/work are going well.     Labs discussed with patient?  Yes    [place] Prescription Drug Monitoring Program Reviewed:  Yes; as expected    Medications:  cloNIDine (CATAPRES) 0.1 MG tablet, Take 1 tablet (0.1 mg) by mouth 3 times daily as needed (opiate withdrawal) (Patient not taking: Reported on 8/4/2023)  naloxone (NARCAN) 4 MG/0.1ML nasal spray, Spray 1 spray (4 mg) into one nostril alternating nostrils as needed for opioid reversal every 2-3 minutes until assistance arrives  ondansetron (ZOFRAN) 4 MG tablet, Take 1 tablet (4 mg) by mouth every 8 hours as needed for nausea (Patient not taking: Reported on 5/2/2023)  polyethylene glycol (MIRALAX) 17 GM/Dose powder, Take 17 g (1 capful.) by mouth daily (Patient not taking: Reported on 8/4/2023)  traZODone (DESYREL) 50 MG tablet, Take 1 tablet (50 mg)  by mouth At Bedtime (Patient not taking: Reported on 8/4/2023)    No current facility-administered medications on file prior to visit.      Allergies   Allergen Reactions    Cephalosporins Unknown    Amoxicillin Rash and Unknown       PMH, PSH, FamHx reviewed      OBJECTIVE                                                      /87   Pulse 58     Physical Exam  Vitals and nursing note reviewed.   Constitutional:       General: He is not in acute distress.     Appearance: Normal appearance. He is not ill-appearing or diaphoretic.   Eyes:      General: No scleral icterus.  Cardiovascular:      Rate and Rhythm: Normal rate.   Pulmonary:      Effort: Pulmonary effort is normal. No respiratory distress.   Skin:     General: Skin is warm and dry.      Coloration: Skin is not jaundiced or pale.   Neurological:      General: No focal deficit present.      Mental Status: He is alert and oriented to person, place, and time.      Gait: Gait normal.   Psychiatric:         Mood and Affect: Mood normal.         Behavior: Behavior normal.         Thought Content: Thought content normal.         Judgment: Judgment normal.         Labs:    UDS:    Lab Results   Component Value Date    BUP Screen Positive (A) 10/17/2023    BZO Negative 10/17/2023    BAR Negative 10/17/2023    FERNANDA Negative 10/17/2023    MAMP Negative 10/17/2023    AMP Negative 10/17/2023    MDMA Negative 10/17/2023    MTD Negative 10/17/2023    [id number] Negative 10/17/2023    OXY Negative 10/17/2023    PCP Negative 10/17/2023    THC Screen Positive (A) 10/17/2023    TEMP 92 F 10/17/2023    SGPOCT 1.025 10/17/2023     *POC urine drug screen does not screen for Fentanyl      Recent Results (from the past 240 hour(s))   Drugs of Abuse Screen Urine (POC CUPS) POCT    Collection Time: 10/17/23 11:16 AM   Result Value Ref Range    POCT Kit Lot Number [id number]     POCT Kit Expiration Date 5/16/25     Temperature Urine POCT 92 F 90 F, 92 F, 94 F, 96 F, 98 F, 100 F     Specific [place] POCT 1.025 1.005, 1.015, 1.025    pH Qual Urine POCT 5 pH 4 pH, 5 pH, 7 pH, 9 pH    Creatinine Qual Urine POCT 100 mg/dL 20 mg/dL, 50 mg/dL, 100 mg/dL, 200 mg/dL    Internal QC Qual Urine POCT Valid Valid    Amphetamine Qual Urine POCT Negative Negative    Barbiturate Qual Urine POCT Negative Negative    Buprenorphine Qual Urine POCT Screen Positive (A) Negative    Benzodiazepine Qual Urine POCT Negative Negative    Cocaine Qual Urine POCT Negative Negative    Methamphetamine Qual Urine POCT Negative Negative    MDMA Qual Urine POCT Negative Negative    Methadone Qual Urine POCT Negative Negative    Opiate Qual Urine POCT Negative Negative    Oxycodone Qual Urine POCT Negative Negative    Phencyclidine Qual Urine POCT Negative Negative    THC Qual Urine POCT Screen Positive (A) Negative         Ross Dao MD   Addiction Medicine fellow    I saw the patient with the fellow and participated in key portions of the service, including the mental status examination and developing the plan of care. I reviewed key portions of the history with the resident. I agree with the findings and plan as documented in this note.     Odalis Jama MD  Addiction Medicine  [place] [id number] [address] [address]  [phone]

## 2023-10-17 NOTE — NURSING NOTE
[facility]    Discuss not wanting to do Sublocade.   Rooming information:  Approximate last use of full opioid agonist: Aprox. Nov. 2022  Taking buprenorphine? Yes: Suboxone  How much per day? 2.5 strips  Number of buprenorphine films/tablets remaining currently: 0  Side effects related to buprenorphine (constipation, dry mouth, sedation?) Yes: Constipation   Narcan currently available: Yes  Other recent substance use:    Cannabis   NICOTINE-No  If using nicotine, ready to quit? NA    Point of care urine drug screen positive for:  Lab Results   Component Value Date    BUP Screen Positive (A) 10/17/2023    BZO Negative 10/17/2023    BAR Negative 10/17/2023    FERNANDA Negative 10/17/2023    MAMP Negative 10/17/2023    AMP Negative 10/17/2023    MDMA Negative 10/17/2023    MTD Negative 10/17/2023    [id number] Negative 10/17/2023    OXY Negative 10/17/2023    PCP Negative 10/17/2023    THC Screen Positive (A) 10/17/2023    TEMP 92 F 10/17/2023    SGPOCT 1.025 10/17/2023       *POC urine drug screen does not screen for Fentanyl          10/17/2023    11:00 AM   PHQ Assesment Total Score(s)   PHQ-9 Score 0       If PHQ-9 score of 15 or higher, has [facility] therapist or provider been notified? N/A    Any current suicidal ideation? No  If yes, has [facility] therapist or provider been notified? N/A    Primary care provider: Saba Hickman MD     Mental health provider: none (follow up on MH referral if needed)    Insurance needs: active    Housing needs: stable    Current legal issues: none    Contact information up to date? yes    3rd Party Involvement no (please obtain YOBANY if pt would like to include)    Waqar Joseph MA  October 17, 2023  11:12 AM

## 2023-11-07 NOTE — PROGRESS NOTES
URGENT CARE VISIT:    SUBJECTIVE:   Edward Glover is a 33 year old male presenting with a chief complaint of fever, stuffy nose, cough - non-productive, sore throat and loss of smell. Onset was 1 day(s) ago.   He denies the following symptoms: shortness of breath, sore throat and diarrhea. Course of illness is same.  Treatment measures tried include None tried with no relief of symptoms.  Predisposing factors include exposed to COVID.    He would also like help quitting narcotics. He started about 2.5 years ago after doctor prescribed for ear pain. He has been taking them since. He feels like he can still function at work and home.     PMH: History reviewed. No pertinent past medical history.  Allergies: Cephalosporins and Amoxicillin   Medications:   Current Outpatient Medications   Medication Sig Dispense Refill     cyclobenzaprine (FLEXERIL) 10 MG tablet [CYCLOBENZAPRINE (FLEXERIL) 10 MG TABLET] Take 1 tablet (10 mg total) by mouth 3 (three) times a day as needed for muscle spasms. (Patient not taking: Reported on 3/21/2023) 15 tablet 0     Social History:   Social History     Tobacco Use     Smoking status: Former     Smokeless tobacco: Not on file   Substance Use Topics     Alcohol use: No       ROS:  Review of systems negative except as stated above.    OBJECTIVE:  /82   Pulse 76   Temp 98.6  F (37  C) (Oral)   Resp 16   Wt 101.2 kg (223 lb)   SpO2 97%   BMI 29.42 kg/m    GENERAL APPEARANCE: healthy, alert and no distress  EYES: EOMI,  PERRL, conjunctiva clear  HENT: ear canals and TM's normal.  Nose and mouth without ulcers, erythema or lesions  NECK: supple, nontender, no lymphadenopathy  RESP: lungs clear to auscultation - no rales, rhonchi or wheezes  CV: regular rates and rhythm, normal S1 S2, no murmur noted  SKIN: no suspicious lesions or rashes      ASSESSMENT:    ICD-10-CM    1. Exposure to 2019 novel coronavirus  Z20.822 Symptomatic COVID-19 Virus (Coronavirus) by PCR Nose      2.  Narcotic dependence (H)  F11.20 Adult [facility] Referral          PLAN:  45 minutes spent on the date of the encounter doing chart review, review of outside records, patient visit and documentation   Patient Instructions   COVID exposure:  Patient was educated on the natural course of viral illness.  Isolate at home. If COVID negative then may return to normal activities. Conservative measures discussed including increased fluids, warm steamy shower, and analgesics (Tylenol and/or Ibuprofen). See your primary care provider if symptoms worsen or do not improve in 7 days. Seek emergency care if you develop fever over 104 or shortness of breath.     Narcotic dependence:  Referred to chemical dependence specialist.   Patient verbalized understanding and is agreeable to plan. The patient was discharged ambulatory and in stable condition.    Arabella Acosta PA-C ....................  3/21/2023   4:40 PM    Ambulatory

## 2023-11-17 ENCOUNTER — OFFICE VISIT (OUTPATIENT)
Dept: BEHAVIORAL HEALTH | Facility: CLINIC | Age: 33
End: 2023-11-17
Payer: COMMERCIAL

## 2023-11-17 VITALS — DIASTOLIC BLOOD PRESSURE: 76 MMHG | HEART RATE: 79 BPM | SYSTOLIC BLOOD PRESSURE: 122 MMHG

## 2023-11-17 DIAGNOSIS — K59.03 THERAPEUTIC OPIOID-INDUCED CONSTIPATION (OIC): ICD-10-CM

## 2023-11-17 DIAGNOSIS — T40.2X5A THERAPEUTIC OPIOID-INDUCED CONSTIPATION (OIC): ICD-10-CM

## 2023-11-17 DIAGNOSIS — F11.20 OPIOID USE DISORDER, SEVERE, DEPENDENCE (H): Primary | ICD-10-CM

## 2023-11-17 PROCEDURE — 80305 DRUG TEST PRSMV DIR OPT OBS: CPT | Performed by: FAMILY MEDICINE

## 2023-11-17 PROCEDURE — 99214 OFFICE O/P EST MOD 30 MIN: CPT | Performed by: FAMILY MEDICINE

## 2023-11-17 RX ORDER — BUPRENORPHINE AND NALOXONE 8; 2 MG/1; MG/1
2 FILM, SOLUBLE BUCCAL; SUBLINGUAL DAILY
Qty: 60 FILM | Refills: 1 | Status: SHIPPED | OUTPATIENT
Start: 2023-11-17 | End: 2023-12-11

## 2023-11-17 RX ORDER — POLYETHYLENE GLYCOL 3350 17 G/17G
17 POWDER, FOR SOLUTION ORAL DAILY
Qty: 578 G | Refills: 11 | Status: SHIPPED | OUTPATIENT
Start: 2023-11-17 | End: 2024-05-13

## 2023-11-17 NOTE — NURSING NOTE
[facility]      Rooming information:  Approximate last use of full opioid agonist: nov 2022  Taking buprenorphine? Yes:   How much per day? 2.5  Number of buprenorphine films/tablets remaining currently: 2  Side effects related to buprenorphine (constipation, dry mouth, sedation?) Yes: constipation   Narcan currently available: Yes  Other recent substance use:    Cannabis   NICOTINE-No  If using nicotine, ready to quit? NA    Point of care urine drug screen positive for:  Lab Results   Component Value Date    BUP Screen Positive (A) 11/17/2023    BZO Negative 11/17/2023    BAR Negative 11/17/2023    FERNANDA Negative 11/17/2023    MAMP Negative 11/17/2023    AMP Negative 11/17/2023    MDMA Negative 11/17/2023    MTD Negative 11/17/2023    [id number] Negative 11/17/2023    OXY Negative 11/17/2023    PCP Negative 11/17/2023    THC Screen Positive (A) 11/17/2023    TEMP 92 F 11/17/2023    SGPOCT >=1.030 (A) 11/17/2023       *POC urine drug screen does not screen for Fentanyl          10/17/2023    11:00 AM   PHQ Assesment Total Score(s)   PHQ-9 Score 0       If PHQ-9 score of 15 or higher, has [facility] therapist or provider been notified? N/A    Any current suicidal ideation? No  If yes, has [facility] therapist or provider been notified? N/A    Primary care provider: Saba Hickman MD     Mental health provider: NONE (follow up on MH referral if needed)    Insurance needs: active    Housing needs: stable    Current legal issues: none    Contact information up to date? yes    3rd Party Involvement none (please obtain YOBANY if pt would like to include)    Waqar Joseph MA  November 17, 2023  11:22 AM

## 2023-11-17 NOTE — PROGRESS NOTES
[facility] Follow Up    ASSESSMENT/PLAN                                                    1. Opioid use disorder, severe, dependence (H)  Last use of oxycodone remains 5/12/23  Continues to take therapeutic level of buprenorphine most days.  Would like to continue treatment unchanged at this time.   Continue buprenorphine prescribed 16mg/day.   Reviewed aspects of safe storage, out of sight/out of reach of children  - Drugs of Abuse Screen Urine (POC CUPS) POCT  - buprenorphine HCl-naloxone HCl (SUBOXONE) 8-2 MG per film; Place 2 Film under the tongue daily  Dispense: 60 Film; Refill: 1    2. Therapeutic opioid-induced constipation (OIC)  Encouraged use of Miralax   - polyethylene glycol (MIRALAX) 17 GM/Dose powder; Take 17 g by mouth daily  Dispense: 578 g; Refill: 11    Return in about 2 months (around 1/17/2024) for Follow up, in person.     Patient counseling completed today:  Discussed mechanism of action, potential risks/benefits/side effects of medications and other recommendations above.      Harm reduction counseling including never use alone, availability of naloxone, risks associated with concurrent use of opioids and benzodiazepines, alcohol, or other sedatives, safer administration as applicable.  Discussed importance of avoiding isolation, building a network of supportive relationships, avoiding people/places/things associated with past use to reduce risk of relapse; including motivational interviewing regarding psychosocial treatment for addiction.   SUBJECTIVE                                                          CC/HPI:  Edward Glover is a 33 year old male with PMH  opioid use disorder who presents to the [facility] for return visit.       Brief History:  Edward Glover was first seen in Recovery Clinic on 05/17/23 interested in starting buprenorphine. He had recently stopped use of oxycodone and wanted to remain sober.   Prescribed buprenorphine through   after initial visit.      11/16/23 visit:  Pt states he has been taking buprenorphine on most days.  He alternates taking 20mg/day one week and 12mg/day other weeks.  On weekends he takes a day off from buprenorphine.  Notices constipation is less when he takes lower doses.  He does notice cravings occasionally when he does not take any on weekends.  Denies return to use.    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       Substance Use History :  Opioids:   Age at first use: [age] used codeine cough syrup, oxycodone started age 29  Current use: substance: percocet 10 mg ; quantity 3- 6 pills daily; route: oral ; timing of last use: 5/12/23     IV drug use: No   History of overdose: No  Previous residential or outpatient treatments for addiction : Yes for court  Previous medication treatments for addiction: No  Longest period of sobriety: 5/12/23 to present  Medical complications related to substance use: denies  Hepatitis C:  5/17/23 HCV ab nonreactive  HIV: 5/17/23 HIV ag/ab nonreactive     Other Addiction History:  Stimulants   Ecstasy as a child; h/o cocaine addiction, last use 2017  Sedatives/hypnotics/anxiolytics:   Xanax- age 25  Alcohol:   Rare   Nicotine:   vaping  Cannabis:   Occasionally   Hallucinogens/Dissociatives:   Occasionally  Eating disorder:  denies  Gambling:              denies    PAST PSYCHIATRIC HISTORY:  Diagnoses- PTSD  Suicide Attempts: Yes attempted to OD w/ cocaine 2017, entered treatment after   Hospitalizations: No         8/4/2023     9:00 AM 9/21/2023    10:00 AM 10/17/2023    11:00 AM   PHQ   PHQ-9 Total Score 0 9 0   Q9: Thoughts of better off dead/self-harm past 2 weeks Not at all Not at all Not at all       Social History  Housing status: alone  Employment status: Employed full time  Relationship status: Single  Children: 6 children  Legal: denies      Labs discussed with patient?  Yes    [place] Prescription Drug Monitoring Program Reviewed:  Yes; as  expected    Medications:  [] buprenorphine HCl-naloxone HCl (SUBOXONE) 4-1 MG per film, Place 1 Film under the tongue daily as needed (opioid cravings)  buprenorphine HCl-naloxone HCl (SUBOXONE) 8-2 MG per film, Place 2 Film under the tongue daily  cloNIDine (CATAPRES) 0.1 MG tablet, Take 1 tablet (0.1 mg) by mouth 3 times daily as needed (opiate withdrawal) (Patient not taking: Reported on 2023)  naloxone (NARCAN) 4 MG/0.1ML nasal spray, Spray 1 spray (4 mg) into one nostril alternating nostrils as needed for opioid reversal every 2-3 minutes until assistance arrives  ondansetron (ZOFRAN) 4 MG tablet, Take 1 tablet (4 mg) by mouth every 8 hours as needed for nausea (Patient not taking: Reported on 2023)  polyethylene glycol (MIRALAX) 17 GM/Dose powder, Take 17 g (1 capful.) by mouth daily (Patient not taking: Reported on 2023)  traZODone (DESYREL) 50 MG tablet, Take 1 tablet (50 mg) by mouth At Bedtime (Patient not taking: Reported on 2023)    No current facility-administered medications on file prior to visit.      Allergies   Allergen Reactions    Cephalosporins Unknown    Amoxicillin Rash and Unknown       PMH, PSH, FamHx reviewed      OBJECTIVE                                                      /76   Pulse 79     Physical Exam  Vitals and nursing note reviewed.   Constitutional:       General: He is not in acute distress.     Appearance: Normal appearance. He is not ill-appearing or diaphoretic.   Eyes:      General: No scleral icterus.  Cardiovascular:      Rate and Rhythm: Normal rate.   Pulmonary:      Effort: Pulmonary effort is normal. No respiratory distress.   Skin:     General: Skin is warm and dry.      Coloration: Skin is not jaundiced or pale.   Neurological:      General: No focal deficit present.      Mental Status: He is alert and oriented to person, place, and time.      Gait: Gait normal.   Psychiatric:         Mood and Affect: Mood normal.         Behavior:  Behavior normal.         Thought Content: Thought content normal.         Judgment: Judgment normal.         Labs:    UDS:    Lab Results   Component Value Date    BUP Screen Positive (A) 11/17/2023    BZO Negative 11/17/2023    BAR Negative 11/17/2023    FERNANDA Negative 11/17/2023    MAMP Negative 11/17/2023    AMP Negative 11/17/2023    MDMA Negative 11/17/2023    MTD Negative 11/17/2023    [id number] Negative 11/17/2023    OXY Negative 11/17/2023    PCP Negative 11/17/2023    THC Screen Positive (A) 11/17/2023    TEMP 92 F 11/17/2023    SGPOCT >=1.030 (A) 11/17/2023     *POC urine drug screen does not screen for Fentanyl      Recent Results (from the past 240 hour(s))   Drugs of Abuse Screen Urine (POC CUPS) POCT    Collection Time: 11/17/23 11:24 AM   Result Value Ref Range    POCT Kit Lot Number [id number]     POCT Kit Expiration Date 6/26/25     Temperature Urine POCT 92 F 90 F, 92 F, 94 F, 96 F, 98 F, 100 F    Specific Gravity POCT >=1.030 (A) 1.005, 1.015, 1.025    pH Qual Urine POCT 5 pH 4 pH, 5 pH, 7 pH, 9 pH    Creatinine Qual Urine POCT 200 mg/dL 20 mg/dL, 50 mg/dL, 100 mg/dL, 200 mg/dL    Internal QC Qual Urine POCT Valid Valid    Amphetamine Qual Urine POCT Negative Negative    Barbiturate Qual Urine POCT Negative Negative    Buprenorphine Qual Urine POCT Screen Positive (A) Negative    Benzodiazepine Qual Urine POCT Negative Negative    Cocaine Qual Urine POCT Negative Negative    Methamphetamine Qual Urine POCT Negative Negative    MDMA Qual Urine POCT Negative Negative    Methadone Qual Urine POCT Negative Negative    Opiate Qual Urine POCT Negative Negative    Oxycodone Qual Urine POCT Negative Negative    Phencyclidine Qual Urine POCT Negative Negative    THC Qual Urine POCT Screen Positive (A) Negative         Odalis Jama MD  Addiction Medicine  [place] [id number] [address] [id number] [phone]

## 2023-12-11 ENCOUNTER — TELEPHONE (OUTPATIENT)
Dept: BEHAVIORAL HEALTH | Facility: CLINIC | Age: 33
End: 2023-12-11
Payer: COMMERCIAL

## 2023-12-11 DIAGNOSIS — F11.20 OPIOID USE DISORDER, SEVERE, DEPENDENCE (H): Primary | ICD-10-CM

## 2023-12-11 RX ORDER — BUPRENORPHINE AND NALOXONE 8; 2 MG/1; MG/1
2.5 FILM, SOLUBLE BUCCAL; SUBLINGUAL DAILY
Qty: 75 FILM | Refills: 0 | Status: SHIPPED | OUTPATIENT
Start: 2023-12-11 | End: 2024-01-11

## 2023-12-11 NOTE — TELEPHONE ENCOUNTER
Reason for call:  Other   Patient called regarding (reason for call): prescription  Additional comments: pt Is calling requesting  refill on sbxn -says he only has 4 left. Patient says he is usually prescribed 2 1/2 strips per day but only got 2 strips so medication isn't lasting     Phone number to reach patient:  Home number on file [phone] (home)    Best Time:  any     Can we leave a detailed message on this number?  YES    Travel screening: Negative

## 2023-12-11 NOTE — TELEPHONE ENCOUNTER
Phone call to patient. Patient reports that taking Suboxone 8-2mg, 2.5 films daily works best for him and the provider he saw last time told him it was ok to take 2.5 films. Currently Suboxone rx is for 8-2mg, 2 films daily, one refill; recommended follow up 1/17/24.     Patient has been taking 2.5 Suboxone films daily and will be out before refill can be filled on 12/17/23. Routing to Dr. Jama for recommendations. If new Suboxone rx sent, patient prefers it be sent to the [facility], [address], in [place].    Aminah Cano RN on 12/11/2023 at 4:22 PM

## 2023-12-11 NOTE — TELEPHONE ENCOUNTER
Rx sent to [facility].     1. Opioid use disorder, severe, dependence (H)    - buprenorphine HCl-naloxone HCl (SUBOXONE) 8-2 MG per film; Place 2.5 Film under the tongue daily  Dispense: 75 Film; Refill: 0

## 2023-12-12 NOTE — TELEPHONE ENCOUNTER
Pt called wanting an update. Writer informed pt RN left a vm yesterday. Pt apologized and hung up.

## 2023-12-12 NOTE — TELEPHONE ENCOUNTER
"Reason for Call:  Other call back    Detailed comments: Pt requesting a call back from RN. States rx was switched from 2.5 to 2 after pt left his appt. Pt called when he has 4 left. Now only has 2 left. Took 1 this morning and will take 1 more tonight. Per pt, care team was going to \"push\" rx through.     Routing to RN pool to further assist.   Phone Number Patient can be reached at: Cell number on file:    Telephone Information:   Mobile [phone]       Best Time: Works from 6:30 am -2:30 pm. Anytime after that.    Can we leave a detailed message on this number? YES    Call taken on 12/12/2023 at 3:31 PM by Ramon Badillo    "

## 2023-12-12 NOTE — TELEPHONE ENCOUNTER
Writer spoke with patient who wanted to confirm pharmacy that the bridge was sent into. Writer spoke with [facility]'s pharmacy who reported the prescription is processing and appears to have gone through the insurance with a co-pay of $1.00. Writer contacted [facility] to cancel the refill on file for Suboxone, this had already been canceled yesterday per the pharmacy.     Rashida Ordaz RN on 12/12/2023 at 4:00 PM

## 2024-01-11 DIAGNOSIS — F11.20 OPIOID USE DISORDER, SEVERE, DEPENDENCE (H): ICD-10-CM

## 2024-01-11 RX ORDER — BUPRENORPHINE AND NALOXONE 8; 2 MG/1; MG/1
2.5 FILM, SOLUBLE BUCCAL; SUBLINGUAL DAILY
Qty: 5 FILM | Refills: 0 | Status: SHIPPED | OUTPATIENT
Start: 2024-01-11 | End: 2024-02-09

## 2024-01-11 NOTE — TELEPHONE ENCOUNTER
Reason for call:  Other   Patient called regarding (reason for call): prescription  Additional comments: pt is requesting bridge until malika tomorrow - I did state if pt no shows this appt he ha t be considerd a walk in     Phone number to reach patient:  Home number on file [phone] (home)    Best Time:  any     Can we leave a detailed message on this number?  YES    Travel screening: Negative

## 2024-01-11 NOTE — TELEPHONE ENCOUNTER
RN notified patient of bridge of medication and pharmacy location.     Patient was thankful and states he will see us tomorrow.     Diana Rodríguez RN on 1/11/2024 at 4:11 PM

## 2024-01-11 NOTE — TELEPHONE ENCOUNTER
1. Opioid use disorder, severe, dependence (H)    - buprenorphine HCl-naloxone HCl (SUBOXONE) 8-2 MG per film; Place 2.5 Film under the tongue daily  Dispense: 5 Film; Refill: 0

## 2024-02-09 ENCOUNTER — OFFICE VISIT (OUTPATIENT)
Dept: BEHAVIORAL HEALTH | Facility: CLINIC | Age: 34
End: 2024-02-09
Payer: COMMERCIAL

## 2024-02-09 ENCOUNTER — TELEPHONE (OUTPATIENT)
Dept: BEHAVIORAL HEALTH | Facility: CLINIC | Age: 34
End: 2024-02-09

## 2024-02-09 VITALS — SYSTOLIC BLOOD PRESSURE: 125 MMHG | HEART RATE: 61 BPM | DIASTOLIC BLOOD PRESSURE: 74 MMHG

## 2024-02-09 DIAGNOSIS — F11.20 OPIOID USE DISORDER, SEVERE, DEPENDENCE (H): Primary | ICD-10-CM

## 2024-02-09 DIAGNOSIS — T40.2X5A THERAPEUTIC OPIOID-INDUCED CONSTIPATION (OIC): ICD-10-CM

## 2024-02-09 DIAGNOSIS — K59.03 THERAPEUTIC OPIOID-INDUCED CONSTIPATION (OIC): ICD-10-CM

## 2024-02-09 LAB
AMPHETAMINE QUAL URINE POCT: NEGATIVE
BARBITURATE QUAL URINE POCT: NEGATIVE
BENZODIAZEPINE QUAL URINE POCT: NEGATIVE
BUPRENORPHINE QUAL URINE POCT: ABNORMAL
COCAINE QUAL URINE POCT: NEGATIVE
CREAT UR-MCNC: 524 MG/DL
CREATININE QUAL URINE POCT: ABNORMAL
INTERNAL QC QUAL URINE POCT: ABNORMAL
MDMA QUAL URINE POCT: NEGATIVE
METHADONE QUAL URINE POCT: NEGATIVE
METHAMPHETAMINE QUAL URINE POCT: NEGATIVE
OPIATE QUAL URINE POCT: NEGATIVE
OXYCODONE QUAL URINE POCT: ABNORMAL
PH QUAL URINE POCT: ABNORMAL
PHENCYCLIDINE QUAL URINE POCT: NEGATIVE
POCT KIT EXPIRATION DATE: ABNORMAL
POCT KIT LOT NUMBER: ABNORMAL
SPECIFIC GRAVITY POCT: 1.02
TEMPERATURE URINE POCT: ABNORMAL
THC QUAL URINE POCT: ABNORMAL

## 2024-02-09 PROCEDURE — 80305 DRUG TEST PRSMV DIR OPT OBS: CPT | Performed by: FAMILY MEDICINE

## 2024-02-09 PROCEDURE — G0482 DRUG TEST DEF 15-21 CLASSES: HCPCS | Performed by: FAMILY MEDICINE

## 2024-02-09 PROCEDURE — 99214 OFFICE O/P EST MOD 30 MIN: CPT | Performed by: FAMILY MEDICINE

## 2024-02-09 RX ORDER — BUPRENORPHINE AND NALOXONE 8; 2 MG/1; MG/1
2.5 FILM, SOLUBLE BUCCAL; SUBLINGUAL DAILY
Qty: 75 FILM | Refills: 0 | Status: SHIPPED | OUTPATIENT
Start: 2024-02-09 | End: 2024-03-28

## 2024-02-09 ASSESSMENT — PATIENT HEALTH QUESTIONNAIRE - PHQ9: SUM OF ALL RESPONSES TO PHQ QUESTIONS 1-9: 9

## 2024-02-09 NOTE — NURSING NOTE
[facility]      Rooming information:  Approximate last use of full opioid agonist: 2022  Taking buprenorphine? Yes:   How much per day? 2.5  Number of buprenorphine films/tablets remaining currently: 2  Side effects related to buprenorphine (constipation, dry mouth, sedation?) Yes:   Narcan currently available: Yes  Other recent substance use:    Cannabis   NICOTINE-No      Point of care urine drug screen positive for:  Lab Results   Component Value Date    BUP Screen Positive (A) 02/09/2024    BZO Negative 02/09/2024    BAR Negative 02/09/2024    FERNANDA Negative 02/09/2024    MAMP Negative 02/09/2024    AMP Negative 02/09/2024    MDMA Negative 02/09/2024    MTD Negative 02/09/2024    [id number] Negative 02/09/2024    OXY Screen Positive (A) 02/09/2024    PCP Negative 02/09/2024    THC Screen Positive (A) 02/09/2024    TEMP Invalid (A) 02/09/2024    SGPOCT 1.025 02/09/2024       *POC urine drug screen does not screen for Fentanyl          2/9/2024    12:00 PM   PHQ Assesment Total Score(s)   PHQ-9 Score 9       If PHQ-9 score of 15 or higher, has [facility] therapist or provider been notified? No    Any current suicidal ideation? No  If yes, has [facility] therapist or provider been notified? N/A    Primary care provider: Saba Hickman MD     Mental health provider: none (follow up on MH referral if needed)    Insurance needs: active    Housing needs: stable    Current legal issues: none    Contact information up to date? yes    3rd Party Involvement not today (please obtain YOBANY if pt would like to include)    Shruthi Barkley MA  February 9, 2024  12:48 PM

## 2024-02-09 NOTE — TELEPHONE ENCOUNTER
Received fax from pharmacy requesting prior authorization for Suboxone and naloxone. Phone call to pharmacy. Prescription went through insurance without difficulty when ran as brand name. No prior authorization needed.    Diana Rodríguez RN

## 2024-02-09 NOTE — PROGRESS NOTES
[facility] Follow Up    ASSESSMENT/PLAN                                                      1. Opioid use disorder, severe, dependence (H)  Needs improvement.  Reviewed importance of taking Suboxone as prescribed and ensuring consistent follow-up to avoid running out of medication.  Harm reduction reviewed, risks of opioids in other substances emphasized.  Increase Suboxone to 20mg TDD to better address cravings,  reviewed benefits of  taking consistently.  Narcan provided.  Encouraged recovery activities.    - Drugs of Abuse Screen Urine (POC CUPS) POCT  - Drug Confirmation Panel Urine with Creat - lab collect; Future  - buprenorphine HCl-naloxone HCl (SUBOXONE) 8-2 MG per film; Place 2.5 Film under the tongue daily  Dispense: 75 Film; Refill: 0  - naloxone (NARCAN) 4 MG/0.1ML nasal spray; Spray 1 spray (4 mg) into one nostril alternating nostrils as needed for opioid reversal every 2-3 minutes until assistance arrives  Dispense: 0.2 mL; Refill: 11  - Drug Confirmation Panel Urine with Creat - lab collect    2. Therapeutic opioid-induced constipation (OIC)  Well managed with Miralax, does not need refill.             Return in about 4 weeks (around 3/8/2024) for Follow up, in person.    Patient counseling completed today:  Discussed mechanism of action, potential risks/benefits/side effects of medications and other recommendations above.     Discussed risk of precipitated withdrawal with initiation of buprenorphine in the presence of full opioid agonists.    Reviewed directions for initiation of buprenorphine to reduce risk of precipitated withdrawal and maximize efficacy.    Harm reduction counseling including never use alone, availability of naloxone, risks associated with concurrent use of opioids and benzodiazepines, alcohol, or other sedatives, safer administration as applicable.  Discussed importance of avoiding isolation, building a network of supportive relationships, avoiding  people/places/things associated with past use to reduce risk of relapse; including motivational interviewing regarding psychosocial interventions.   SUBJECTIVE                                                          CC/HPI:  Edward Glover is a 33 year old male with PMH  opioid use disorder who presents to the Recovery Clinic for return visit.       Brief History:  Edward Glover was first seen in Recovery Clinic on 05/17/23 interested in starting buprenorphine. He had recently stopped use of oxycodone and wanted to remain sober.   Prescribed buprenorphine through  after initial visit.      Substance Use History :  Opioids:   Age at first use: [age] used codeine cough syrup, oxycodone started age 29  Current use: substance: percocet 10 mg ; quantity 3- 6 pills daily; route: oral ; timing of last use: 5/12/23     IV drug use: No   History of overdose: No  Previous residential or outpatient treatments for addiction : Yes for court  Previous medication treatments for addiction: No  Longest period of sobriety: 5/12/23 to present  Medical complications related to substance use: denies  Hepatitis C:  5/17/23 HCV ab nonreactive  HIV: 5/17/23 HIV ag/ab nonreactive     Other Addiction History:  Stimulants   Ecstasy as a child; h/o cocaine addiction, last use 2017  Sedatives/hypnotics/anxiolytics:   Xanax- age 25  Alcohol:   Rare   Nicotine:   vaping  Cannabis:   Occasionally   Hallucinogens/Dissociatives:   Occasionally  Eating disorder:  denies  Gambling:              denies     PAST PSYCHIATRIC HISTORY:  Diagnoses- PTSD  Suicide Attempts: Yes attempted to OD w/ cocaine 2017, entered treatment after   Hospitalizations: No         9/21/2023    10:00 AM 10/17/2023    11:00 AM 2/9/2024    12:00 PM   PHQ   PHQ-9 Total Score 9 0 9   Q9: Thoughts of better off dead/self-harm past 2 weeks Not at all Not at all Not at all     Social History  Housing status: alone  Employment status: Employed full time  Relationship status:  Single  Children: 6 children  Legal: denies      Most recent Recovery Clinic visit: 11/17/23    A/P last visit:  1. Opioid use disorder, severe, dependence (H)  Last use of oxycodone remains 5/12/23  Continues to take therapeutic level of buprenorphine most days.  Would like to continue treatment unchanged at this time.   Continue buprenorphine prescribed 16mg/day.   Reviewed aspects of safe storage, out of sight/out of reach of children  - Drugs of Abuse Screen Urine (POC CUPS) POCT  - buprenorphine HCl-naloxone HCl (SUBOXONE) 8-2 MG per film; Place 2 Film under the tongue daily  Dispense: 60 Film; Refill: 1     2. Therapeutic opioid-induced constipation (OIC)  Encouraged use of Miralax   - polyethylene glycol (MIRALAX) 17 GM/Dose powder; Take 17 g by mouth daily  Dispense: 578 g; Refill: 11    02/09/24 visit:  Doing well, no concerns  Taking Suboxone 2.5films per day - felt like this was a good dose - was able to function well in daily life, cravings well controlled with Suboxone as prescribed, denies side effects other than constipation  States he has been out of Suboxone for a few weeks - has bought some from another person a few days ago   Considering Sublocade  Kratom about 4 days ago to help withdrawal  Denies known use of other opioids  Cousin shot last night in another state    Labs discussed with patient?  Yes      [place] Prescription Drug Monitoring Program Reviewed:  Yes; as expected    Medications:  polyethylene glycol (MIRALAX) 17 GM/Dose powder, Take 17 g by mouth daily    No current facility-administered medications on file prior to visit.      Allergies   Allergen Reactions    Cephalosporins Unknown    Amoxicillin Rash and Unknown       PMH, PSH, FamHx reviewed      OBJECTIVE                                                      /74   Pulse 61     Physical Exam  Vitals and nursing note reviewed.   Constitutional:       General: He is not in acute distress.     Appearance: Normal appearance.  He is not ill-appearing or diaphoretic.   Eyes:      General: No scleral icterus.  Cardiovascular:      Rate and Rhythm: Normal rate.   Pulmonary:      Effort: Pulmonary effort is normal. No respiratory distress.   Skin:     Coloration: Skin is not jaundiced or pale.   Neurological:      General: No focal deficit present.      Mental Status: He is alert and oriented to person, place, and time.      Gait: Gait normal.   Psychiatric:         Mood and Affect: Mood is anxious and depressed. Affect is not inappropriate.         Behavior: Behavior normal.         Thought Content: Thought content normal.         Judgment: Judgment normal.         Labs:    UDS:    Lab Results   Component Value Date    BUP Screen Positive (A) 02/09/2024    BZO Negative 02/09/2024    BAR Negative 02/09/2024    FERNANDA Negative 02/09/2024    MAMP Negative 02/09/2024    AMP Negative 02/09/2024    MDMA Negative 02/09/2024    MTD Negative 02/09/2024    [id number] Negative 02/09/2024    OXY Screen Positive (A) 02/09/2024    PCP Negative 02/09/2024    THC Screen Positive (A) 02/09/2024    TEMP Invalid (A) 02/09/2024    SGPOCT 1.025 02/09/2024     *POC urine drug screen does not screen for Fentanyl      Recent Results (from the past 240 hour(s))   Drugs of Abuse Screen Urine (POC CUPS) POCT    Collection Time: 02/09/24 12:52 PM   Result Value Ref Range    POCT Kit Lot Number [id number]     POCT Kit Expiration Date 6792378     Temperature Urine POCT Invalid (A) 90 F, 92 F, 94 F, 96 F, 98 F, 100 F    Specific [place] POCT 1.025 1.005, 1.015, 1.025    pH Qual Urine POCT 5 pH 4 pH, 5 pH, 7 pH, 9 pH    Creatinine Qual Urine POCT 100 mg/dL 20 mg/dL, 50 mg/dL, 100 mg/dL, 200 mg/dL    Internal QC Qual Urine POCT Valid Valid    Amphetamine Qual Urine POCT Negative Negative    Barbiturate Qual Urine POCT Negative Negative    Buprenorphine Qual Urine POCT Screen Positive (A) Negative    Benzodiazepine Qual Urine POCT Negative Negative    Cocaine Qual Urine POCT  Negative Negative    Methamphetamine Qual Urine POCT Negative Negative    MDMA Qual Urine POCT Negative Negative    Methadone Qual Urine POCT Negative Negative    Opiate Qual Urine POCT Negative Negative    Oxycodone Qual Urine POCT Screen Positive (A) Negative    Phencyclidine Qual Urine POCT Negative Negative    THC Qual Urine POCT Screen Positive (A) Negative         [facility]  [address] [place] [id number] [phone]

## 2024-02-09 NOTE — PATIENT INSTRUCTIONS
Sublocade Scheduling at Infusion Center     Your provider has ordered the injectable medication, Sublocade, as part of your treatment plan. This medication must be approved by your insurance company before it can be administered. That process is called a prior authorization. To start the prior authorization process, you need to schedule a Sublocade appointment at the [facility].     Please call the [facility] at [phone] to schedule your Sublocade injection as soon as possible.     If your insurance does not approve the Sublocade injection before your appointment, you will be contacted to reschedule your appointment at the [facility].     [facility]   [address], 2nd [place] [address]   [phone]     Other potential sites for Sublocade administration:  [facility] ([place]): [phone], option #3  [facility] ([place]): [phone]   [facility] ([place]): [phone]   [facility] ([place]): [phone]     Copays and Deductibles     If you have private or employer-based insurance, your insurance company may approve the Sublocade prior authorization, however may not cover any copay or deductible costs. Please contact your insurance directly regarding copay/deductible costs.     If you need copay/deductible assistance, contact [employer] at [email or web address] or [phone].     Eligibility requirements for copay/deductible assistance for patients with private insurance are as follows:      Private health insurance not funded by a government organization    At least 18 years of age and less than 65 years of age    Resident of the [place] or [place] [facility]    Resident of a state where copay assistance is not prohibited    Private insurance does not prohibit coupons/copay assistance for SUBLOCADE    Prescribed SUBLOCADE for an indication approved  by the Food and Drug    Administration

## 2024-02-13 LAB
BUPRENORPHINE UR CFM-MCNC: 20 NG/ML
BUPRENORPHINE/CREAT UR: 4 NG/MG {CREAT}
NORBUPRENORPHINE UR CFM-MCNC: 55 NG/ML
NORBUPRENORPHINE/CREAT UR: 10 NG/MG {CREAT}
OXYCODONE UR CFM-MCNC: 111 NG/ML
OXYCODONE/CREAT UR: 21 NG/MG {CREAT}
OXYMORPHONE UR CFM-MCNC: 323 NG/ML
OXYMORPHONE/CREAT UR: 62 NG/MG {CREAT}

## 2024-03-28 ENCOUNTER — OFFICE VISIT (OUTPATIENT)
Dept: BEHAVIORAL HEALTH | Facility: CLINIC | Age: 34
End: 2024-03-28
Payer: COMMERCIAL

## 2024-03-28 VITALS — HEART RATE: 69 BPM | SYSTOLIC BLOOD PRESSURE: 127 MMHG | DIASTOLIC BLOOD PRESSURE: 67 MMHG

## 2024-03-28 DIAGNOSIS — Z51.81 ENCOUNTER FOR MONITORING OPIOID MAINTENANCE THERAPY: ICD-10-CM

## 2024-03-28 DIAGNOSIS — F12.90 CANNABIS USE, UNCOMPLICATED: ICD-10-CM

## 2024-03-28 DIAGNOSIS — Z79.891 ENCOUNTER FOR MONITORING OPIOID MAINTENANCE THERAPY: ICD-10-CM

## 2024-03-28 DIAGNOSIS — F11.20 OPIOID USE DISORDER, SEVERE, DEPENDENCE (H): Primary | ICD-10-CM

## 2024-03-28 LAB
AMPHETAMINE QUAL URINE POCT: NEGATIVE
BARBITURATE QUAL URINE POCT: NEGATIVE
BENZODIAZEPINE QUAL URINE POCT: NEGATIVE
BUPRENORPHINE QUAL URINE POCT: ABNORMAL
COCAINE QUAL URINE POCT: NEGATIVE
CREAT UR-MCNC: 273 MG/DL
CREATININE QUAL URINE POCT: ABNORMAL
INTERNAL QC QUAL URINE POCT: ABNORMAL
MDMA QUAL URINE POCT: NEGATIVE
METHADONE QUAL URINE POCT: NEGATIVE
METHAMPHETAMINE QUAL URINE POCT: NEGATIVE
OPIATE QUAL URINE POCT: ABNORMAL
OXYCODONE QUAL URINE POCT: NEGATIVE
PH QUAL URINE POCT: ABNORMAL
PHENCYCLIDINE QUAL URINE POCT: NEGATIVE
POCT KIT EXPIRATION DATE: ABNORMAL
POCT KIT LOT NUMBER: ABNORMAL
SPECIFIC GRAVITY POCT: 1.02
TEMPERATURE URINE POCT: ABNORMAL
THC QUAL URINE POCT: ABNORMAL

## 2024-03-28 PROCEDURE — 80305 DRUG TEST PRSMV DIR OPT OBS: CPT | Performed by: FAMILY MEDICINE

## 2024-03-28 PROCEDURE — 99214 OFFICE O/P EST MOD 30 MIN: CPT | Performed by: FAMILY MEDICINE

## 2024-03-28 PROCEDURE — G0480 DRUG TEST DEF 1-7 CLASSES: HCPCS | Performed by: FAMILY MEDICINE

## 2024-03-28 RX ORDER — BUPRENORPHINE AND NALOXONE 8; 2 MG/1; MG/1
2 FILM, SOLUBLE BUCCAL; SUBLINGUAL DAILY
Qty: 60 FILM | Refills: 0 | Status: SHIPPED | OUTPATIENT
Start: 2024-03-28 | End: 2024-05-13

## 2024-03-28 ASSESSMENT — PATIENT HEALTH QUESTIONNAIRE - PHQ9: SUM OF ALL RESPONSES TO PHQ QUESTIONS 1-9: 0

## 2024-03-28 NOTE — PROGRESS NOTES
[facility] Follow Up    ASSESSMENT/PLAN                                                    1. Opioid use disorder, severe, dependence (H)  Pt denying intentional use of full opioid agonist, UDS today +opiates.  Pt agreeable to confirmatory testing.   He has decreased his buprenorphine dose to 12mg/day.  He is comfortable taking 16mg/day per today's rx.   Pt is considering transfer to [facility], discuss again next visit.   Pt has Miralax if needed for OIC  Pt confirms he has naloxone  - Drugs of Abuse Screen Urine (POC CUPS) POCT  - Drug Confirmation Panel Urine with Creat - lab collect; Future  - buprenorphine HCl-naloxone HCl (SUBOXONE) 8-2 MG per film; Place 2 Film under the tongue daily  Dispense: 60 Film; Refill: 0  - Drug Confirmation Panel Urine with Creat - lab collect    2. Encounter for monitoring opioid maintenance therapy  - Drug Confirmation Panel Urine with Creat - lab collect; Future  - Drug Confirmation Panel Urine with Creat - lab collect    3. Cannabis use, uncomplicated  Pt denies problems related to use, though he is concerned now that the cannabis he is purchasing is contaminated with opiates or other substances.  Reviewed appropriate concern over risks involved with current drug supply and encouraged avoiding, also discussed fentanyl test strips which he planned to obtain.      Return in about 4 weeks (around 4/25/2024).    Patient counseling completed today:  Discussed mechanism of action, potential risks/benefits/side effects of medications and other recommendations above.    Harm reduction counseling including never use alone, availability of naloxone, risks associated with concurrent use of opioids and benzodiazepines, alcohol, or other sedatives, safer administration as applicable.  Discussed importance of avoiding isolation, building a network of supportive relationships, avoiding people/places/things associated with past use to reduce risk of relapse; including  motivational interviewing regarding psychosocial interventions.   SUBJECTIVE                                                          CC/HPI:  Edward Glover is a 34 year old male with PMH  opioid use disorder who presents to the Recovery Clinic for return visit.       Brief History:  Edward Glover was first seen in Recovery Clinic on 05/17/23 interested in starting buprenorphine. He had recently stopped use of oxycodone and wanted to remain sober.   Prescribed buprenorphine through  after initial visit.      Substance Use History :  Opioids:   Age at first use: [age] used codeine cough syrup, oxycodone started age 29  Current use: substance: percocet 10 mg ; quantity 3- 6 pills daily; route: oral ; timing of last use: 5/12/23     IV drug use: No   History of overdose: No  Previous residential or outpatient treatments for addiction : Yes for court  Previous medication treatments for addiction: No  Longest period of sobriety: 5/12/23 to present  Medical complications related to substance use: denies  Hepatitis C:  5/17/23 HCV ab nonreactive  HIV: 5/17/23 HIV ag/ab nonreactive     Other Addiction History:  Stimulants   Ecstasy as a child; h/o cocaine addiction, last use 2017  Sedatives/hypnotics/anxiolytics:   Xanax- age 25  Alcohol:   Rare   Nicotine:   vaping  Cannabis:   Occasionally   Hallucinogens/Dissociatives:   Occasionally  Eating disorder:  denies  Gambling:              denies     PAST PSYCHIATRIC HISTORY:  Diagnoses- PTSD  Suicide Attempts: Yes attempted to OD w/ cocaine 2017, entered treatment after   Hospitalizations: No         10/17/2023    11:00 AM 2/9/2024    12:00 PM 3/28/2024     9:00 AM   PHQ   PHQ-9 Total Score 0 9 0   Q9: Thoughts of better off dead/self-harm past 2 weeks Not at all Not at all Not at all     Social History  Housing status: alone  Employment status: Employed full time  Relationship status: Single  Children: 6 children  Legal: denies      Most recent Recovery Clinic  visit: 2/9/24    A/P last visit:  1. Opioid use disorder, severe, dependence (H)  Needs improvement.  Reviewed importance of taking Suboxone as prescribed and ensuring consistent follow-up to avoid running out of medication.  Harm reduction reviewed, risks of opioids in other substances emphasized.  Increase Suboxone to 20mg TDD to better address cravings,  reviewed benefits of  taking consistently.  Narcan provided.  Encouraged recovery activities.    - Drugs of Abuse Screen Urine (POC CUPS) POCT  - Drug Confirmation Panel Urine with Creat - lab collect; Future  - buprenorphine HCl-naloxone HCl (SUBOXONE) 8-2 MG per film; Place 2.5 Film under the tongue daily  Dispense: 75 Film; Refill: 0  - naloxone (NARCAN) 4 MG/0.1ML nasal spray; Spray 1 spray (4 mg) into one nostril alternating nostrils as needed for opioid reversal every 2-3 minutes until assistance arrives  Dispense: 0.2 mL; Refill: 11  - Drug Confirmation Panel Urine with Creat - lab collect     2. Therapeutic opioid-induced constipation (OIC)  Well managed with Miralax, does not need refill.                  Return in about 4 weeks (around 3/8/2024) for Follow up, in person.      3/28/24 visit:  Pt returns for clinic ~3 weeks beyond requested follow-up time.  Pt states he was incarcerated for ~2 weeks since his last visit, was released on Monday 3/25/24.  After a few days in halfway, he was started on buprenorphine.  He states he was only given 12mg/day while in halfway.  Since release from halfway he states he has continued to take 12mg TDD (8mg am and 4mg pm.)  He reiterates his eventual goal of tapering off buprenorphine.  He also brings up his extreme dislike of the taste.  He has been thinking more about eventually transferring to [facility], but is not ready for this yet.   When asked about recent full opioid agonist use based on UDS +opiates today, he adamantly denies intentionally taking any other opioids.  He states he has only smoked cannabis since release  from long term.     When asked about his UDS and confirmatory testing from last visit +oxycodone, he states that is consistent with him taking oxycodone while out of town and out of buprenorphine to address withdrawal.  He reports he did not enjoy the feeling when he used oxycodone in 2/2024 and has no intention or desire to return to use.   He has been laid off and receiving unemployment.  He is enjoying this time off to spend more time with his dogs.  He recently got a 2nd dog and plans to breed them.      Labs discussed with patient?  Yes      [place] Prescription Drug Monitoring Program Reviewed:  Yes; as expected  02/09/2024 02/09/2024 3 Suboxone 8 Mg-2 Mg Sl Film 75.00 30 Ka Par [id number] [facility] ([id number]) 0/0 20.00 mg Medicaid [place]   01/11/2024 01/11/2024 3 Suboxone 8 Mg-2 Mg Sl Film 5.00 2 Li Vol [id number] [facility] ([id number]) 0/0 20.00 mg [place]   12/13/2023 12/11/2023 3 Suboxone 8 Mg-2 Mg Sl Film 75.00 30 Li Vol [id number] [facility] ([id number]) 0/0 20.00 mg [place]   11/17/2023 11/17/2023 2 Suboxone 8 Mg-2 Mg Sl Film 60.00 30 Li Vol [id number] [facility] ([id number])         Medications:  buprenorphine HCl-naloxone HCl (SUBOXONE) 8-2 MG per film, Place 2.5 Film under the tongue daily  naloxone (NARCAN) 4 MG/0.1ML nasal spray, Spray 1 spray (4 mg) into one nostril alternating nostrils as needed for opioid reversal every 2-3 minutes until assistance arrives  polyethylene glycol (MIRALAX) 17 GM/Dose powder, Take 17 g by mouth daily    No current facility-administered medications on file prior to visit.      Allergies   Allergen Reactions    Cephalosporins Unknown    Amoxicillin Rash and Unknown       PMH, PSH, FamHx reviewed      OBJECTIVE                                                      /67   Pulse 69     Physical Exam  Vitals and nursing note reviewed.   Constitutional:       General: He is not in acute distress.     Appearance: Normal appearance. He is not ill-appearing or diaphoretic.   HENT:      Nose: No rhinorrhea.   Eyes:      General:  No scleral icterus.     Extraocular Movements: Extraocular movements intact.      Conjunctiva/sclera: Conjunctivae normal.   Pulmonary:      Effort: Pulmonary effort is normal.   Neurological:      Mental Status: He is alert and oriented to person, place, and time.      Coordination: Coordination is intact.      Gait: Gait is intact.   Psychiatric:         Attention and Perception: Attention and perception normal.         Mood and Affect: Mood normal. Affect is not inappropriate.         Speech: Speech normal.         Behavior: Behavior normal. Behavior is cooperative.         Thought Content: Thought content normal.         Judgment: Judgment normal.         Labs:    UDS:    Lab Results   Component Value Date    BUP Screen Positive (A) 03/28/2024    BZO Negative 03/28/2024    BAR Negative 03/28/2024    FERNANDA Negative 03/28/2024    MAMP Negative 03/28/2024    AMP Negative 03/28/2024    MDMA Negative 03/28/2024    MTD Negative 03/28/2024    [id number] Screen Positive (A) 03/28/2024    OXY Negative 03/28/2024    PCP Negative 03/28/2024    THC Screen Positive (A) 03/28/2024    TEMP 90 F 03/28/2024    SGPOCT 1.025 03/28/2024     *POC urine drug screen does not screen for Fentanyl      Recent Results (from the past 240 hour(s))   Drugs of Abuse Screen Urine (POC CUPS) POCT    Collection Time: 03/28/24  9:52 AM   Result Value Ref Range    POCT Kit Lot Number [id number]     POCT Kit Expiration Date 45796344     Temperature Urine POCT 90 F 90 F, 92 F, 94 F, 96 F, 98 F, 100 F    Specific [place] POCT 1.025 1.005, 1.015, 1.025    pH Qual Urine POCT 5 pH 4 pH, 5 pH, 7 pH, 9 pH    Creatinine Qual Urine POCT 50 mg/dL 20 mg/dL, 50 mg/dL, 100 mg/dL, 200 mg/dL    Internal QC Qual Urine POCT Valid Valid    Amphetamine Qual Urine POCT Negative Negative    Barbiturate Qual Urine POCT Negative Negative    Buprenorphine Qual Urine POCT Screen Positive (A) Negative    Benzodiazepine Qual Urine POCT Negative Negative    Cocaine Qual Urine POCT  Negative Negative    Methamphetamine Qual Urine POCT Negative Negative    MDMA Qual Urine POCT Negative Negative    Methadone Qual Urine POCT Negative Negative    Opiate Qual Urine POCT Screen Positive (A) Negative    Oxycodone Qual Urine POCT Negative Negative    Phencyclidine Qual Urine POCT Negative Negative    THC Qual Urine POCT Screen Positive (A) Negative       Odalis Jama MD  Addiction Medicine  [facility]  [address] [place] [address] [id number] [phone]

## 2024-03-28 NOTE — NURSING NOTE
[facility]  Pt returned to  for follow up visit, last visit was on 2/9/24 . Pt has no questions or concerns, just relased from snf on Monday 3/25/24  Rooming information:  Approximate last use of full opioid agonist: 2022  Taking buprenorphine? Yes:   How much per day? 2.5   Number of buprenorphine films/tablets remaining currently: 0  Side effects related to buprenorphine (constipation, dry mouth, sedation?) No   Narcan currently available: Yes  Other recent substance use:    Cannabis   NICOTINE-No      Point of care urine drug screen positive for:  Lab Results   Component Value Date    BUP Screen Positive (A) 03/28/2024    BZO Negative 03/28/2024    BAR Negative 03/28/2024    FERNANDA Negative 03/28/2024    MAMP Negative 03/28/2024    AMP Negative 03/28/2024    MDMA Negative 03/28/2024    MTD Negative 03/28/2024    [id number] Screen Positive (A) 03/28/2024    OXY Negative 03/28/2024    PCP Negative 03/28/2024    THC Screen Positive (A) 03/28/2024    TEMP 90 F 03/28/2024    SGPOCT 1.025 03/28/2024       *POC urine drug screen does not screen for Fentanyl          3/28/2024     9:00 AM   PHQ Assesment Total Score(s)   PHQ-9 Score 0       If PHQ-9 score of 15 or higher, has Recovery Clinic therapist or provider been notified? No    Any current suicidal ideation? No  If yes, has Recovery Clinic therapist or provider been notified? No    Primary care provider: Saba Hickman MD     Mental health provider: none (follow up on MH referral if needed)    Insurance needs: Active    Housing needs: Stable    Current legal issues: none    Contact information up to date? yes    3rd Party Involvement not today (please obtain YOBANY if pt would like to include)    Shruthi Barkley MA  March 28, 2024  9:34 AM

## 2024-04-01 LAB
BUPRENORPHINE UR CFM-MCNC: 42 NG/ML
BUPRENORPHINE/CREAT UR: 15 NG/MG {CREAT}
NALOXONE UR CFM-MCNC: 46 NG/ML
NALOXONE: 17 NG/MG {CREAT}
NORBUPRENORPHINE UR CFM-MCNC: 44 NG/ML
NORBUPRENORPHINE/CREAT UR: 16 NG/MG {CREAT}

## 2024-05-13 ENCOUNTER — OFFICE VISIT (OUTPATIENT)
Dept: BEHAVIORAL HEALTH | Facility: CLINIC | Age: 34
End: 2024-05-13
Payer: COMMERCIAL

## 2024-05-13 ENCOUNTER — TELEPHONE (OUTPATIENT)
Dept: BEHAVIORAL HEALTH | Facility: CLINIC | Age: 34
End: 2024-05-13

## 2024-05-13 VITALS
DIASTOLIC BLOOD PRESSURE: 76 MMHG | OXYGEN SATURATION: 100 % | HEART RATE: 64 BPM | WEIGHT: 219.5 LBS | BODY MASS INDEX: 29.09 KG/M2 | HEIGHT: 73 IN | SYSTOLIC BLOOD PRESSURE: 123 MMHG

## 2024-05-13 DIAGNOSIS — F12.90 CANNABIS USE, UNCOMPLICATED: ICD-10-CM

## 2024-05-13 DIAGNOSIS — T40.2X5A THERAPEUTIC OPIOID-INDUCED CONSTIPATION (OIC): ICD-10-CM

## 2024-05-13 DIAGNOSIS — K59.03 THERAPEUTIC OPIOID-INDUCED CONSTIPATION (OIC): ICD-10-CM

## 2024-05-13 DIAGNOSIS — F11.20 OPIOID USE DISORDER, SEVERE, DEPENDENCE (H): Primary | ICD-10-CM

## 2024-05-13 PROCEDURE — 99214 OFFICE O/P EST MOD 30 MIN: CPT | Performed by: FAMILY MEDICINE

## 2024-05-13 PROCEDURE — G2211 COMPLEX E/M VISIT ADD ON: HCPCS | Performed by: FAMILY MEDICINE

## 2024-05-13 PROCEDURE — 80305 DRUG TEST PRSMV DIR OPT OBS: CPT | Performed by: FAMILY MEDICINE

## 2024-05-13 RX ORDER — POLYETHYLENE GLYCOL 3350 17 G/17G
17 POWDER, FOR SOLUTION ORAL DAILY PRN
Qty: 578 G | Refills: 11 | Status: SHIPPED | OUTPATIENT
Start: 2024-05-13 | End: 2024-09-20

## 2024-05-13 RX ORDER — BUPRENORPHINE AND NALOXONE 8; 2 MG/1; MG/1
2 FILM, SOLUBLE BUCCAL; SUBLINGUAL DAILY
Qty: 60 FILM | Refills: 0 | Status: SHIPPED | OUTPATIENT
Start: 2024-05-13 | End: 2024-07-19

## 2024-05-13 ASSESSMENT — PAIN SCALES - GENERAL: PAINLEVEL: NO PAIN (0)

## 2024-05-13 ASSESSMENT — PATIENT HEALTH QUESTIONNAIRE - PHQ9: SUM OF ALL RESPONSES TO PHQ QUESTIONS 1-9: 0

## 2024-05-13 NOTE — PROGRESS NOTES
[facility]      Rooming information:    Approximate last use of full opioid agonist:  ~2 years ago  Taking buprenorphine?  ran out  a couple days ago How much per day? 16 mg/day  Number of buprenorphine films/tablets remaining currently: 0  Side effects related to buprenorphine (constipation, dry mouth, sedation?) Yes: constipation   Narcan currently available: No  Other recent substance use:   THC every other day  NICOTINE-Yes: vape  If using nicotine, ready to quit? No    Point of care urine drug screen positive for:  Lab Results   Component Value Date    BUP Screen Positive (A) 05/13/2024    BZO Negative 05/13/2024    BAR Negative 05/13/2024    FERNANDA Negative 05/13/2024    MAMP Negative 05/13/2024    AMP Negative 05/13/2024    MDMA Negative 05/13/2024    MTD Negative 05/13/2024    [id number] Negative 05/13/2024    OXY Negative 05/13/2024    PCP Negative 05/13/2024    THC Screen Positive (A) 05/13/2024    TEMP 92 F 05/13/2024    SGPOCT >=1.030 (A) 05/13/2024       *POC urine drug screen does not screen for Fentanyl          5/13/2024     9:00 AM   PHQ Assesment Total Score(s)   PHQ-9 Score 0       If PHQ-9 score of 15 or higher, has Recovery Clinic therapist or provider been notified? No    Any current suicidal ideation? No  If yes, has Recovery Clinic therapist or provider been notified? N/A    Primary care provider: Saba Hickman MD     Mental health provider: none (follow up on MH referral if needed)    Housing needs: stable    Insurance: HP    Current legal issues: denies    Contact information up to date? yes    3rd Party Involvement NA (please obtain YOBANY if pt would like to include)    Gayatri Murry LPN  May 13, 2024  9:50 AM

## 2024-05-13 NOTE — TELEPHONE ENCOUNTER
Received fax from pharmacy requesting prior authorization for naloxone. Phone call to pharmacy. Prescription went through insurance without difficulty when ran as brand name. No prior authorization needed.    Aminah Cano RN

## 2024-05-13 NOTE — PROGRESS NOTES
[facility] Follow Up    ASSESSMENT/PLAN                                                      1. Opioid use disorder, severe, dependence (H)  Reporting symptoms are well controlled on Suboxone 8-2mg BID so we will continue this dose without change.  He expresses desire to stop buprenorphine in the future.  Reviewed importance of working with medical team to do this to minimize withdrawal.  Also reviewed option of Sublocade again - reviewed why it might be beneficial to him and allows for more flexibility in follow-ups.    - Drugs of Abuse Screen Urine (POC CUPS) POCT  - buprenorphine HCl-naloxone HCl (SUBOXONE) 8-2 MG per film; Place 2 Film under the tongue daily  Dispense: 60 Film; Refill: 0  - naloxone (NARCAN) 4 MG/0.1ML nasal spray; Spray 1 spray (4 mg) into one nostril alternating nostrils as needed for opioid reversal every 2-3 minutes until assistance arrives  Dispense: 0.2 mL; Refill: 11    2. Therapeutic opioid-induced constipation (OIC)  Stable with Miralax as needed.    - polyethylene glycol (MIRALAX) 17 GM/Dose powder; Take 17 g by mouth daily as needed for constipation  Dispense: 578 g; Refill: 11    3. Cannabis use, uncomplicated  Reports reduction in use, encouraged continued changes.         Return in about 4 weeks (around 6/10/2024) for Follow up, in person.      Patient counseling completed today:  Discussed mechanism of action, potential risks/benefits/side effects of medications and other recommendations above.     Discussed risk of precipitated withdrawal with initiation of buprenorphine in the presence of full opioid agonists.    Reviewed directions for initiation of buprenorphine to reduce risk of precipitated withdrawal and maximize efficacy.    Harm reduction counseling including never use alone, availability of naloxone, risks associated with concurrent use of opioids and benzodiazepines, alcohol, or other sedatives, safer administration as applicable.  Discussed  importance of avoiding isolation, building a network of supportive relationships, avoiding people/places/things associated with past use to reduce risk of relapse; including motivational interviewing regarding psychosocial interventions.   SUBJECTIVE                                                          CC/HPI:  Edward Glover is a 34 year old male with PMH  opioid use disorder who presents to the Recovery Clinic for return visit.     First Recovery Clinic visit: 5/17/23  Most recent Recovery Clinic visit: 3/28/24    A/P last visit:  1. Opioid use disorder, severe, dependence (H)  Pt denying intentional use of full opioid agonist, UDS today +opiates.  Pt agreeable to confirmatory testing.   He has decreased his buprenorphine dose to 12mg/day.  He is comfortable taking 16mg/day per today's rx.   Pt is considering transfer to [facility], discuss again next visit.   Pt has Miralax if needed for OIC  Pt confirms he has naloxone  - Drugs of Abuse Screen Urine (POC CUPS) POCT  - Drug Confirmation Panel Urine with Creat - lab collect; Future  - buprenorphine HCl-naloxone HCl (SUBOXONE) 8-2 MG per film; Place 2 Film under the tongue daily  Dispense: 60 Film; Refill: 0  - Drug Confirmation Panel Urine with Creat - lab collect     2. Encounter for monitoring opioid maintenance therapy  - Drug Confirmation Panel Urine with Creat - lab collect; Future  - Drug Confirmation Panel Urine with Creat - lab collect     3. Cannabis use, uncomplicated  Pt denies problems related to use, though he is concerned now that the cannabis he is purchasing is contaminated with opiates or other substances.  Reviewed appropriate concern over risks involved with current drug supply and encouraged avoiding, also discussed fentanyl test strips which he planned to obtain.         05/13/24 visit:  No specific concerns today  Wants to wean off Suboxone eventually - tried to stop taking for a few days, got sick so resumed   Moved into a more secure  place - now living in a house up in [place], [place]  Feels that Suboxone 8-2mg BID is effective, no cravings, no side effect (constipation well controlled)  Decreasing cannabis use and changed to delta 9   Still unsure about Sublocade    Last date of full opioid agonist use: 2022    Brief History:  Edward Glover was first seen in Recovery Clinic on 05/17/23 interested in starting buprenorphine. He had recently stopped use of oxycodone and wanted to remain sober.   Prescribed buprenorphine through  after initial visit.       Substance Use History :  Opioids:   Age at first use: [age] used codeine cough syrup, oxycodone started age 29  Current use: substance: percocet 10 mg ; quantity 3- 6 pills daily; route: oral ; timing of last use: 5/12/23     IV drug use: No   History of overdose: No  Previous residential or outpatient treatments for addiction : Yes for court  Previous medication treatments for addiction: No  Longest period of sobriety: 5/12/23 to present  Medical complications related to substance use: denies  Hepatitis C:  5/17/23 HCV ab nonreactive  HIV: 5/17/23 HIV ag/ab nonreactive     Other Addiction History:  Stimulants   Ecstasy as a child; h/o cocaine addiction, last use 2017  Sedatives/hypnotics/anxiolytics:   Xanax- age 25  Alcohol:   Rare   Nicotine:   vaping  Cannabis:   Occasionally   Hallucinogens/Dissociatives:   Occasionally  Eating disorder:  denies  Gambling:              denies     PAST PSYCHIATRIC HISTORY:  Diagnoses- PTSD  Suicide Attempts: Yes attempted to OD w/ cocaine 2017, entered treatment after   Hospitalizations: No         2/9/2024    12:00 PM 3/28/2024     9:00 AM 5/13/2024     9:00 AM   PHQ   PHQ-9 Total Score 9 0 0   Q9: Thoughts of better off dead/self-harm past 2 weeks Not at all Not at all Not at all       Social History  Housing status: alone  Employment status: Employed full time - [employer], laid off  Relationship status: Single  Children: 6 children  Legal:  "denies      Labs discussed with patient?  Yes      [place] Prescription Drug Monitoring Program Reviewed:  Yes; as expected    Medications:  Current Outpatient Medications   Medication Sig Dispense Refill    buprenorphine HCl-naloxone HCl (SUBOXONE) 8-2 MG per film Place 2 Film under the tongue daily 60 Film 0    naloxone (NARCAN) 4 MG/0.1ML nasal spray Spray 1 spray (4 mg) into one nostril alternating nostrils as needed for opioid reversal every 2-3 minutes until assistance arrives 0.2 mL 11    polyethylene glycol (MIRALAX) 17 GM/Dose powder Take 17 g by mouth daily as needed for constipation 578 g 11     No current facility-administered medications for this visit.       Allergies   Allergen Reactions    Cephalosporins Unknown    Amoxicillin Rash and Unknown       PMH, PSH, FamHx reviewed      OBJECTIVE                                                      /76 (BP Location: Left arm, Patient Position: Sitting, Cuff Size: Adult Regular)   Pulse 64   Ht 1.854 m (6' 1\")   Wt 99.6 kg (219 lb 8 oz)   SpO2 100%   BMI 28.96 kg/m      Physical Exam  Vitals and nursing note reviewed.   Constitutional:       General: He is not in acute distress.     Appearance: Normal appearance. He is not ill-appearing or diaphoretic.   HENT:      Mouth/Throat:      Mouth: Mucous membranes are moist.   Eyes:      General: No scleral icterus.  Cardiovascular:      Rate and Rhythm: Normal rate.   Pulmonary:      Effort: Pulmonary effort is normal. No respiratory distress.   Skin:     Coloration: Skin is not jaundiced or pale.   Neurological:      General: No focal deficit present.      Mental Status: He is alert and oriented to person, place, and time.   Psychiatric:         Mood and Affect: Mood normal.         Behavior: Behavior normal.         Thought Content: Thought content normal.         Judgment: Judgment normal.         Labs:    UDS:    Lab Results   Component Value Date    BUP Screen Positive (A) 05/13/2024    BZO " Negative 05/13/2024    BAR Negative 05/13/2024    FERNANDA Negative 05/13/2024    MAMP Negative 05/13/2024    AMP Negative 05/13/2024    MDMA Negative 05/13/2024    MTD Negative 05/13/2024    [id number] Negative 05/13/2024    OXY Negative 05/13/2024    PCP Negative 05/13/2024    THC Screen Positive (A) 05/13/2024    TEMP 92 F 05/13/2024    SGPOCT >=1.030 (A) 05/13/2024     *POC urine drug screen does not screen for Fentanyl      Recent Results (from the past 240 hour(s))   Drugs of Abuse Screen Urine (POC CUPS) POCT    Collection Time: 05/13/24  9:48 AM   Result Value Ref Range    POCT Kit Lot Number [id number]     POCT Kit Expiration Date 2026-01-18     Temperature Urine POCT 92 F 90 F, 92 F, 94 F, 96 F, 98 F, 100 F    Specific Gravity POCT >=1.030 (A) 1.005, 1.015, 1.025    pH Qual Urine POCT 5 pH 4 pH, 5 pH, 7 pH, 9 pH    Creatinine Qual Urine POCT 100 mg/dL 20 mg/dL, 50 mg/dL, 100 mg/dL, 200 mg/dL    Internal QC Qual Urine POCT Valid Valid    Amphetamine Qual Urine POCT Negative Negative    Barbiturate Qual Urine POCT Negative Negative    Buprenorphine Qual Urine POCT Screen Positive (A) Negative    Benzodiazepine Qual Urine POCT Negative Negative    Cocaine Qual Urine POCT Negative Negative    Methamphetamine Qual Urine POCT Negative Negative    MDMA Qual Urine POCT Negative Negative    Methadone Qual Urine POCT Negative Negative    Opiate Qual Urine POCT Negative Negative    Oxycodone Qual Urine POCT Negative Negative    Phencyclidine Qual Urine POCT Negative Negative    THC Qual Urine POCT Screen Positive (A) Negative       Shruthi Alfaro, DO  [place] [id number] [place] [address] [phone]

## 2024-07-19 ENCOUNTER — OFFICE VISIT (OUTPATIENT)
Dept: BEHAVIORAL HEALTH | Facility: CLINIC | Age: 34
End: 2024-07-19
Payer: MEDICAID

## 2024-07-19 VITALS — DIASTOLIC BLOOD PRESSURE: 73 MMHG | SYSTOLIC BLOOD PRESSURE: 118 MMHG | HEART RATE: 61 BPM

## 2024-07-19 DIAGNOSIS — Z59.71 DOES NOT HAVE HEALTH INSURANCE: ICD-10-CM

## 2024-07-19 DIAGNOSIS — F11.20 OPIOID USE DISORDER, SEVERE, DEPENDENCE (H): Primary | ICD-10-CM

## 2024-07-19 DIAGNOSIS — F11.93 OPIOID WITHDRAWAL (H): ICD-10-CM

## 2024-07-19 LAB
AMPHETAMINE QUAL URINE POCT: NEGATIVE
BARBITURATE QUAL URINE POCT: NEGATIVE
BENZODIAZEPINE QUAL URINE POCT: NEGATIVE
BUPRENORPHINE QUAL URINE POCT: ABNORMAL
COCAINE QUAL URINE POCT: NEGATIVE
CREATININE QUAL URINE POCT: ABNORMAL
INTERNAL QC QUAL URINE POCT: ABNORMAL
MDMA QUAL URINE POCT: NEGATIVE
METHADONE QUAL URINE POCT: NEGATIVE
METHAMPHETAMINE QUAL URINE POCT: NEGATIVE
OPIATE QUAL URINE POCT: NEGATIVE
OXYCODONE QUAL URINE POCT: ABNORMAL
PH QUAL URINE POCT: ABNORMAL
PHENCYCLIDINE QUAL URINE POCT: NEGATIVE
POCT KIT EXPIRATION DATE: ABNORMAL
POCT KIT LOT NUMBER: ABNORMAL
SPECIFIC GRAVITY POCT: 1.02
TEMPERATURE URINE POCT: ABNORMAL
THC QUAL URINE POCT: ABNORMAL

## 2024-07-19 PROCEDURE — 80305 DRUG TEST PRSMV DIR OPT OBS: CPT | Performed by: FAMILY MEDICINE

## 2024-07-19 PROCEDURE — G2211 COMPLEX E/M VISIT ADD ON: HCPCS | Performed by: FAMILY MEDICINE

## 2024-07-19 PROCEDURE — 99214 OFFICE O/P EST MOD 30 MIN: CPT | Performed by: FAMILY MEDICINE

## 2024-07-19 RX ORDER — BUPRENORPHINE AND NALOXONE 8; 2 MG/1; MG/1
2 FILM, SOLUBLE BUCCAL; SUBLINGUAL DAILY
Qty: 60 FILM | Refills: 0 | Status: SHIPPED | OUTPATIENT
Start: 2024-07-19 | End: 2024-09-20

## 2024-07-19 RX ORDER — CLONIDINE HYDROCHLORIDE 0.1 MG/1
0.1 TABLET ORAL 2 TIMES DAILY PRN
Qty: 8 TABLET | Refills: 0 | Status: SHIPPED | OUTPATIENT
Start: 2024-07-19 | End: 2024-09-20

## 2024-07-19 ASSESSMENT — PATIENT HEALTH QUESTIONNAIRE - PHQ9: SUM OF ALL RESPONSES TO PHQ QUESTIONS 1-9: 9

## 2024-07-19 NOTE — NURSING NOTE
[facility]  Was in [facility],Starting cutting suboxone up, no longer have any left, last suboxone taken a few days ago No feeling well today  Rooming information:    Approximate last use of full opioid agonist:few days, codeine and [place]  Taking buprenorphine? Yes:   How much per day? 16mg cutting them up  Number of buprenorphine films/tablets remaining currently: 0  Side effects related to buprenorphine (constipation, dry mouth, sedation?) No   Narcan currently available: No  Other recent substance use:    Denies  NICOTINE-Yes: vape  If using nicotine, ready to quit? No    Point of care urine drug screen positive for:  Lab Results   Component Value Date    BUP Screen Positive (A) 07/19/2024    BZO Negative 07/19/2024    BAR Negative 07/19/2024    FERNANDA Negative 07/19/2024    MAMP Negative 07/19/2024    AMP Negative 07/19/2024    MDMA Negative 07/19/2024    MTD Negative 07/19/2024    [id number] Negative 07/19/2024    OXY Screen Positive (A) 07/19/2024    PCP Negative 07/19/2024    THC Screen Positive (A) 07/19/2024    TEMP 90 F 07/19/2024    SGPOCT 1.025 07/19/2024       *POC urine drug screen does not screen for Fentanyl        Depression Response    Patient completed the PHQ-9 assessment for depression and scored >9? No  Question 9 on the PHQ-9 was positive for suicidality? No  Does patient have current mental health provider? No    Is this a virtual visit? No    I personally notified the following: NA          7/19/2024    11:00 AM   PHQ Assesment Total Score(s)   PHQ-9 Score 9         Housing needs: stable    Insurance: unsure, just completed paperwork today    Current legal issues: none    Contact information up to date? yes    3rd Party Involvement not today (please obtain YOBANY if pt would like to include)    Shruthi Barkley MA  July 19, 2024  11:09 AM

## 2024-07-19 NOTE — PATIENT INSTRUCTIONS
When it has been at least 24 hours from your last use, resume Suboxone.  Use comfort medications as needed.

## 2024-07-19 NOTE — NURSING NOTE
Patients insurance  on 2024 and patient was unaware. He was given information on reapplying. When RN met with patient, he stated he did just now reapply. RN encouraged him to follow closely in case more forms are needed. He agreed.     For today, will utilize [facility] program.     Diana Rodríguez RN on 2024 at 11:40 AM

## 2024-07-19 NOTE — LETTER
July 19, 2024      Edward Glover  [address] [facility] [address]        To Whom It May Concern:    Edward Glover  was seen on 7/19/24.  He may return to work tomorrow or for his next scheduled shift.          Sincerely,          Shruthi Alfaro DO

## 2024-07-19 NOTE — PROGRESS NOTES
[facility] Follow Up    ASSESSMENT/PLAN                                                      1. Opioid use disorder, severe, dependence (H)  Reports last use was a few days ago.  Would like to resume Suboxone, return to use related to being out of Suboxone.  Emphasized importance of regular follow-up to avoid running out of meds, recommend scheduling visit in 2 weeks instead of waiting 4 weeks (gives him a buffer).  For now will resume Suboxone 16-4mg daily, reviewed risks of precipitated withdrawal.  Has Narcan.  Declines psychosocial treatment recommendations at this time.  - Drugs of Abuse Screen Urine (POC CUPS) POCT; Standing  - Drugs of Abuse Screen Urine (POC CUPS) POCT  - buprenorphine HCl-naloxone HCl (SUBOXONE) 8-2 MG per film; Place 2 Film under the tongue daily  Dispense: 60 Film; Refill: 0    2. Opioid withdrawal (H)  Reviewed use of comfort medication.  - cloNIDine (CATAPRES) 0.1 MG tablet; Take 1 tablet (0.1 mg) by mouth 2 times daily as needed (withdrawal)  Dispense: 8 tablet; Refill: 0    3. Does not have health insurance  He is open to care coordination referral to help him determine insurance options.    - Specialty Care Coordination Referral; Future         Return in about 2 weeks (around 8/2/2024) for Follow up, in person.      Patient counseling completed today:  Discussed mechanism of action, potential risks/benefits/side effects of medications and other recommendations above.     Discussed risk of precipitated withdrawal with initiation of buprenorphine in the presence of full opioid agonists.    Reviewed directions for initiation of buprenorphine to reduce risk of precipitated withdrawal and maximize efficacy.    Harm reduction counseling including never use alone, availability of naloxone, risks associated with concurrent use of opioids and benzodiazepines, alcohol, or other sedatives, safer administration as applicable.  Discussed importance of avoiding isolation,  building a network of supportive relationships, avoiding people/places/things associated with past use to reduce risk of relapse; including motivational interviewing regarding psychosocial interventions.   SUBJECTIVE                                                          CC/HPI:  Edward Glover is a 34 year old male with PMH  opioid use disorder who presents to the [facility] for return visit.     Brief history with Recovery Clinic:   Edward Glover was first seen in Recovery Clinic on 05/17/23 interested in starting buprenorphine. He had recently stopped use of oxycodone and wanted to remain sober.   Prescribed buprenorphine through  after initial visit.      First visit: 5/17/23  Recommendations last visit (5/13/24)  1. Opioid use disorder, severe, dependence (H)  Reporting symptoms are well controlled on Suboxone 8-2mg BID so we will continue this dose without change.  He expresses desire to stop buprenorphine in the future.  Reviewed importance of working with medical team to do this to minimize withdrawal.  Also reviewed option of Sublocade again - reviewed why it might be beneficial to him and allows for more flexibility in follow-ups.    - Drugs of Abuse Screen Urine (POC CUPS) POCT  - buprenorphine HCl-naloxone HCl (SUBOXONE) 8-2 MG per film; Place 2 Film under the tongue daily  Dispense: 60 Film; Refill: 0  - naloxone (NARCAN) 4 MG/0.1ML nasal spray; Spray 1 spray (4 mg) into one nostril alternating nostrils as needed for opioid reversal every 2-3 minutes until assistance arrives  Dispense: 0.2 mL; Refill: 11     2. Therapeutic opioid-induced constipation (OIC)  Stable with Miralax as needed.    - polyethylene glycol (MIRALAX) 17 GM/Dose powder; Take 17 g by mouth daily as needed for constipation  Dispense: 578 g; Refill: 11     3. Cannabis use, uncomplicated  Reports reduction in use, encouraged continued changes.     07/19/24 HPI:  Was in [facility] for a week or so - did not receive Suboxone in  [place]  Ran out of Suboxone a few days ago, was cutting the Suboxone in smaller pieces  Denies opioid use if he has Suboxone  Took Norco a few days ago  Insurance issues today - caught him by surprise since he works with the [employer] - attempted to contact [employer] to clarify but was not able to get resolution today  Struggles with follow-up due to work schedule   Had to leave work early today for visit      Substance Use History :  Opioids:   Age at first use: [age] used codeine cough syrup, oxycodone started age 29  Current use: substance: percocet 10 mg ; quantity 3- 6 pills daily; route: oral ; timing of last use: 5/12/23     IV drug use: No   History of overdose: No  Previous residential or outpatient treatments for addiction : Yes for court  Previous medication treatments for addiction: No  Longest period of sobriety: 5/12/23 to present  Medical complications related to substance use: denies  Hepatitis C:  5/17/23 HCV ab nonreactive  HIV: 5/17/23 HIV ag/ab nonreactive     Other Addiction History:  Stimulants   Ecstasy as a child; h/o cocaine addiction, last use 2017  Sedatives/hypnotics/anxiolytics:   Xanax- age 25  Alcohol:   Rare   Nicotine:   vaping  Cannabis:   Occasionally   Hallucinogens/Dissociatives:   Occasionally  Eating disorder:  denies  Gambling:              denies     PAST PSYCHIATRIC HISTORY:  Diagnoses- PTSD  Suicide Attempts: Yes attempted to OD w/ cocaine 2017, entered treatment after   Hospitalizations: No         3/28/2024     9:00 AM 5/13/2024     9:00 AM 7/19/2024    11:00 AM   PHQ   PHQ-9 Total Score 0 0 9   Q9: Thoughts of better off dead/self-harm past 2 weeks Not at all Not at all Not at all       Social History  Housing status: alone  Employment status: Employed full time - [employer]  Relationship status: Single  Children: 6 children  Legal: denies      Labs discussed with patient?  Yes      [place] Prescription Drug Monitoring Program Reviewed:  Yes - last filled  5/13/24    Medications:  Current Outpatient Medications   Medication Sig Dispense Refill    buprenorphine HCl-naloxone HCl (SUBOXONE) 8-2 MG per film Place 2 Film under the tongue daily 60 Film 0    cloNIDine (CATAPRES) 0.1 MG tablet Take 1 tablet (0.1 mg) by mouth 2 times daily as needed (withdrawal) 8 tablet 0    naloxone (NARCAN) 4 MG/0.1ML nasal spray Spray 1 spray (4 mg) into one nostril alternating nostrils as needed for opioid reversal every 2-3 minutes until assistance arrives 0.2 mL 11    polyethylene glycol (MIRALAX) 17 GM/Dose powder Take 17 g by mouth daily as needed for constipation 578 g 11     No current facility-administered medications for this visit.       Allergies   Allergen Reactions    Cephalosporins Unknown    Amoxicillin Rash and Unknown       PMH, PSH, FamHx reviewed      OBJECTIVE                                                      /73   Pulse 61     Physical Exam  Vitals and nursing note reviewed.   Constitutional:       General: He is not in acute distress.     Appearance: Normal appearance. He is not ill-appearing or diaphoretic.   HENT:      Mouth/Throat:      Mouth: Mucous membranes are moist.   Eyes:      General: No scleral icterus.  Cardiovascular:      Rate and Rhythm: Normal rate.   Pulmonary:      Effort: Pulmonary effort is normal. No respiratory distress.   Skin:     Coloration: Skin is not jaundiced or pale.   Neurological:      General: No focal deficit present.      Mental Status: He is alert and oriented to person, place, and time.   Psychiatric:         Mood and Affect: Mood is anxious. Mood is not depressed.         Thought Content: Thought content normal.      Comments: Irritable, guarded.  Judgment and insight fair.         Labs:    UDS:    Lab Results   Component Value Date    BUP Screen Positive (A) 07/19/2024    BZO Negative 07/19/2024    BAR Negative 07/19/2024    FERNANDA Negative 07/19/2024    MAMP Negative 07/19/2024    AMP Negative 07/19/2024    MDMA Negative  07/19/2024    MTD Negative 07/19/2024    [id number] Negative 07/19/2024    OXY Screen Positive (A) 07/19/2024    PCP Negative 07/19/2024    THC Screen Positive (A) 07/19/2024    TEMP 90 F 07/19/2024    SGPOCT 1.025 07/19/2024     *POC urine drug screen does not screen for Fentanyl      No results found for this or any previous visit (from the past 240 hour(s)).    Shruthi Alfaro, [facility]  [address] [place] [address]  [phone]

## 2024-07-23 ENCOUNTER — PATIENT OUTREACH (OUTPATIENT)
Dept: CARE COORDINATION | Facility: CLINIC | Age: 34
End: 2024-07-23
Payer: COMMERCIAL

## 2024-07-23 NOTE — PROGRESS NOTES
Clinic Care Coordination Contact    PETER WALSH briefly spoke to pt who reported he was not available to talk today and asked for call back on 7/25.     MARIAM Posada? Social Work Care Coordinator   [facility]  [email or web address]? [email or web address]    Phone: [phone]  she/her

## 2024-07-25 ENCOUNTER — PATIENT OUTREACH (OUTPATIENT)
Dept: CARE COORDINATION | Facility: CLINIC | Age: 34
End: 2024-07-25
Payer: COMMERCIAL

## 2024-07-25 NOTE — PROGRESS NOTES
Clinic Care Coordination Contact  [facility]/Voicemail    Clinical Data: Care Coordinator Outreach    Outreach Documentation Number of Outreach Attempt   7/25/2024  10:49 AM 1       Left message on patient's voicemail with call back information and requested return call.    Plan: Care Coordinator will send care coordination introduction letter with care coordinator contact information and explanation of care coordination services via [employer]. Care Coordinator will try to reach patient again in 1-2 business days.    MARIAM Posada? Social Work Care Coordinator   [facility]  [email or web address]? [email or web address]    Phone: [phone]  she/her

## 2024-07-25 NOTE — LETTER
[facility] CARE COORDINATION  Recovery Clinic   July 25, 2024    Edward Glover  [address] [place] [address]  [place] [address]      Dear Edward,    I am a clinic care coordinator who works with Shruthi Alfaro DO with the [facility]. I wanted to introduce myself and provide you with my contact information for you to be able to call me with any questions or concerns. Below is a description of clinic care coordination and how I can further assist you.      First, here are the insurance resources that were requested in the referral:    [employer] Website:   [facility] health insurance marketplace / [employer]    Ph: [phone]    Renew My Coverage DHS Website  [place]://[place].gov/[place]hs[place]renewmycoverage/    [place] Disability Hub  https://disabilityhun.org/   Ph. [phone]      If you need help applying/renewing:    [employer] Navigators  https://[email or web address].org/help/find-assister/find-navigator.jsp,     [facility] [employer]  [email or web address]       The clinic care coordination team is made up of a registered nurse and  who understand the health care system. The goal of clinic care coordination is to help you manage your health and improve access to the health care system. Our team works alongside your provider to assist you in determining your health and social needs. We can help you obtain health care and community resources, providing you with necessary information and education. We can work with you through any barriers and develop a care plan that helps coordinate and strengthen the communication between you and your care team.  Our services are voluntary and are offered without charge to you personally.    Please feel free to contact me with any questions or concerns regarding care coordination and what we can offer.      We are focused on providing you with the highest-quality healthcare experience possible.    Sincerely,     MARIAM Posada? Social Work  Care Coordinator   [facility]  [email or web address]? [email or web address]    Phone: [phone]  she/her

## 2024-07-27 ENCOUNTER — HEALTH MAINTENANCE LETTER (OUTPATIENT)
Age: 34
End: 2024-07-27

## 2024-07-29 ENCOUNTER — PATIENT OUTREACH (OUTPATIENT)
Dept: CARE COORDINATION | Facility: CLINIC | Age: 34
End: 2024-07-29
Payer: COMMERCIAL

## 2024-07-29 NOTE — PROGRESS NOTES
Clinic Care Coordination Contact  Follow Up Progress Note      Assessment: PETER CC followed up with pt to check in on insurance progress. Pt states that he is in the process of checking with the union to check on insurance because he thinks he has insurance through them. No resources needed today as he states he is trying to figure it out.     Care Gaps:    Health Maintenance Due   Topic Date Due    ADVANCE CARE PLANNING  Never done    YEARLY PREVENTIVE VISIT  08/01/2009    HEPATITIS A IMMUNIZATION (2 of 2 - Risk 2-dose series) 04/30/2020    DTAP/TDAP/TD IMMUNIZATION (7 - Td or Tdap) 11/15/2022    COVID-19 Vaccine (1 - 2023-24 season) Never done       Care Plans    Intervention/Education provided during outreach:    Plan:   Care Coordinator will follow up in one month    MARIAM Posada? Social Work Care Coordinator   [facility]  [email or web address]? [email or web address]    Phone: [phone]  she/her    
Detail Level: Zone

## 2024-09-04 ENCOUNTER — PATIENT OUTREACH (OUTPATIENT)
Dept: CARE COORDINATION | Facility: CLINIC | Age: 34
End: 2024-09-04
Payer: COMMERCIAL

## 2024-09-04 NOTE — PROGRESS NOTES
Clinic Care Coordination Contact  Follow Up Progress Note      Assessment: PETER CC followed up with pt on insurance. He states he has not looked into this yet but plans to. No further resources needed.     Care Gaps:    Health Maintenance Due   Topic Date Due    ADVANCE CARE PLANNING  Never done    YEARLY PREVENTIVE VISIT  08/01/2009    HEPATITIS A IMMUNIZATION (2 of 2 - Risk 2-dose series) 04/30/2020    DTAP/TDAP/TD IMMUNIZATION (7 - Td or Tdap) 11/15/2022    INFLUENZA VACCINE (1) 09/01/2024    COVID-19 Vaccine (1 - 2023-24 season) Never done       Care Plans      Intervention/Education provided during outreach:      Outreach Frequency: monthly, more frequently as needed    Plan:   Care Coordinator will follow up in one month     MARIAM Posada? Social Work Care Coordinator   [facility]  [email or web address]? [email or web address]    Phone: [phone]  she/her

## 2024-09-20 ENCOUNTER — OFFICE VISIT (OUTPATIENT)
Dept: BEHAVIORAL HEALTH | Facility: CLINIC | Age: 34
End: 2024-09-20
Payer: MEDICAID

## 2024-09-20 VITALS — HEART RATE: 71 BPM | DIASTOLIC BLOOD PRESSURE: 80 MMHG | SYSTOLIC BLOOD PRESSURE: 139 MMHG

## 2024-09-20 DIAGNOSIS — Z79.891 ENCOUNTER FOR MONITORING OPIOID MAINTENANCE THERAPY: ICD-10-CM

## 2024-09-20 DIAGNOSIS — F11.20 OPIOID USE DISORDER, SEVERE, DEPENDENCE (H): Primary | ICD-10-CM

## 2024-09-20 DIAGNOSIS — Z51.81 ENCOUNTER FOR MONITORING OPIOID MAINTENANCE THERAPY: ICD-10-CM

## 2024-09-20 DIAGNOSIS — K59.03 THERAPEUTIC OPIOID-INDUCED CONSTIPATION (OIC): ICD-10-CM

## 2024-09-20 DIAGNOSIS — F11.93 OPIOID WITHDRAWAL (H): ICD-10-CM

## 2024-09-20 DIAGNOSIS — T40.2X5A THERAPEUTIC OPIOID-INDUCED CONSTIPATION (OIC): ICD-10-CM

## 2024-09-20 PROCEDURE — 80305 DRUG TEST PRSMV DIR OPT OBS: CPT | Performed by: FAMILY MEDICINE

## 2024-09-20 PROCEDURE — 99214 OFFICE O/P EST MOD 30 MIN: CPT | Performed by: FAMILY MEDICINE

## 2024-09-20 RX ORDER — POLYETHYLENE GLYCOL 3350 17 G/17G
17 POWDER, FOR SOLUTION ORAL DAILY PRN
Qty: 578 G | Refills: 11 | Status: SHIPPED | OUTPATIENT
Start: 2024-09-20

## 2024-09-20 RX ORDER — BUPRENORPHINE AND NALOXONE 8; 2 MG/1; MG/1
2 FILM, SOLUBLE BUCCAL; SUBLINGUAL DAILY
Qty: 60 FILM | Refills: 0 | Status: SHIPPED | OUTPATIENT
Start: 2024-09-20

## 2024-09-20 RX ORDER — EPINEPHRINE 1 MG/ML
0.3 INJECTION, SOLUTION, CONCENTRATE INTRAVENOUS EVERY 5 MIN PRN
OUTPATIENT
Start: 2024-09-27

## 2024-09-20 RX ORDER — METHYLPREDNISOLONE SODIUM SUCCINATE 125 MG/2ML
125 INJECTION, POWDER, LYOPHILIZED, FOR SOLUTION INTRAMUSCULAR; INTRAVENOUS
Start: 2024-09-27

## 2024-09-20 RX ORDER — DIPHENHYDRAMINE HYDROCHLORIDE 50 MG/ML
50 INJECTION INTRAMUSCULAR; INTRAVENOUS
Start: 2024-09-27

## 2024-09-20 RX ORDER — LIDOCAINE HYDROCHLORIDE 10 MG/ML
2 INJECTION, SOLUTION EPIDURAL; INFILTRATION; INTRACAUDAL; PERINEURAL ONCE
OUTPATIENT
Start: 2024-09-27 | End: 2024-09-27

## 2024-09-20 RX ORDER — CLONIDINE HYDROCHLORIDE 0.1 MG/1
0.1 TABLET ORAL 2 TIMES DAILY PRN
Qty: 8 TABLET | Refills: 0 | Status: SHIPPED | OUTPATIENT
Start: 2024-09-20

## 2024-09-20 RX ORDER — ALBUTEROL SULFATE 90 UG/1
1-2 AEROSOL, METERED RESPIRATORY (INHALATION)
Start: 2024-09-27

## 2024-09-20 RX ORDER — MEPERIDINE HYDROCHLORIDE 25 MG/ML
25 INJECTION INTRAMUSCULAR; INTRAVENOUS; SUBCUTANEOUS EVERY 30 MIN PRN
OUTPATIENT
Start: 2024-09-27

## 2024-09-20 RX ORDER — ALBUTEROL SULFATE 0.83 MG/ML
2.5 SOLUTION RESPIRATORY (INHALATION)
OUTPATIENT
Start: 2024-09-27

## 2024-09-20 ASSESSMENT — PATIENT HEALTH QUESTIONNAIRE - PHQ9: SUM OF ALL RESPONSES TO PHQ QUESTIONS 1-9: 10

## 2024-09-20 NOTE — NURSING NOTE
[facility]      Rooming information:    In withdrawal; sedation, body aches, hot cold    Approximate last use of full opioid agonist: aprox 4 days   Taking buprenorphine? No: found 1 film of suboxone at home and took it yesterday      Narcan currently available: Yes  Other recent substance use:    Denies  NICOTINE-Yes:   If using nicotine, ready to quit? No    Point of care urine drug screen positive for:  Lab Results   Component Value Date    BUP Screen Positive (A) 09/20/2024    BZO Negative 09/20/2024    BAR Negative 09/20/2024    FERNANDA Negative 09/20/2024    MAMP Negative 09/20/2024    AMP Negative 09/20/2024    MDMA Negative 09/20/2024    MTD Negative 09/20/2024    [id number] Negative 09/20/2024    OXY Screen Positive (A) 09/20/2024    PCP Negative 09/20/2024    THC Screen Positive (A) 09/20/2024    TEMP 90 F 09/20/2024    SGPOCT 1.025 09/20/2024       *POC urine drug screen does not screen for Fentanyl      Depression Response    Patient completed the PHQ-9 assessment for depression and scored >9? Yes  Question 9 on the PHQ-9 was positive for suicidality? No  Does patient have current mental health provider? No  C-SSRS screener risk level.       Is this a virtual visit? No    I personally notified the following: BRONSON           9/20/2024    11:00 AM   PHQ Assesment Total Score(s)   PHQ-9 Score 10         Housing needs: stable     Insurance: believes it is active     Current legal issues: none     Contact information up to date? Yes     3rd Party Involvement  (please obtain YOBANY if pt would like to include)    Waqar Joseph MA  September 20, 2024  11:10 AM

## 2024-09-20 NOTE — PROGRESS NOTES
[facility] Follow Up    ASSESSMENT/PLAN                                                    1. Opioid use disorder, severe, dependence (H)  Last use of oxycodone 9/16/24.  Took 8mg buprenorphine 9/18/24 which reduced his withdrawal symptoms.   Interested in resuming therapeutic buprenorphine dosing, wants to transfer to Sublocade.   Start SL buprenorphine 16mg/day  Sublocade 300mg scheduled for 10/17/24.   Will discontinue scheduled SL buprenorphine after Sublocade initiated.   Obtain baseline labs, ID screening at follow-up visit.  Pt declined today.    Naloxone prescribed  - Drugs of Abuse Screen Urine (POC CUPS) POCT  - buprenorphine HCl-naloxone HCl (SUBOXONE) 8-2 MG per film; Place 2 Film under the tongue daily.  Dispense: 60 Film; Refill: 0  - naloxone (NARCAN) 4 MG/0.1ML nasal spray; Spray 1 spray (4 mg) into one nostril alternating nostrils as needed for opioid reversal. every 2-3 minutes until assistance arrives  Dispense: 0.2 mL; Refill: 11    2. Encounter for monitoring opioid maintenance therapy  - Drugs of Abuse Screen Urine (POC CUPS) POCT    3. Opioid withdrawal (H)  Clonidine for use until stabilized on therapeutic dose of buprenorphine  - cloNIDine (CATAPRES) 0.1 MG tablet; Take 1 tablet (0.1 mg) by mouth 2 times daily as needed (withdrawal).  Dispense: 8 tablet; Refill: 0    4. Therapeutic opioid-induced constipation (OIC)  - polyethylene glycol (MIRALAX) 17 GM/Dose powder; Take 17 g by mouth daily as needed for constipation.  Dispense: 578 g; Refill: 11    Return in 27 days (on 10/17/2024) for Follow up, in person.      Patient counseling completed today:  Discussed mechanism of action, potential risks/benefits/side effects of medications and other recommendations above.     Discussed risk of precipitated withdrawal with initiation of buprenorphine in the presence of full opioid agonists.    Reviewed directions for initiation of buprenorphine to reduce risk of precipitated  withdrawal and maximize efficacy.    Harm reduction counseling including never use alone, availability of naloxone, risks associated with concurrent use of opioids and benzodiazepines, alcohol, or other sedatives, safer administration as applicable.  Discussed importance of avoiding isolation, building a network of supportive relationships, avoiding people/places/things associated with past use to reduce risk of relapse; including motivational interviewing regarding psychosocial interventions.   SUBJECTIVE                                                          CC/HPI:  Edward Glover is a 34 year old male with PMH  opioid use disorder who presents to the Recovery Clinic for return visit.     Brief history with Recovery Clinic:   Edward Glover was first seen in Recovery Clinic on 05/17/23 interested in starting buprenorphine. He had recently stopped use of oxycodone and wanted to remain sober.   Prescribed buprenorphine through  after initial visit.    Intermittent follow-up with periodic return to use of oxycodone when out of buprenorphine.     Lost insurance, lost to follow up after 7/2024  RTC 9/20/24.       9/20/24 HPI  Pt returns to clinic interested in resuming rx buprenorphine.  Insurance is again active.   Last use oxycodone 9/16/24  Took 8mg buprenorphine 9/19/24 which helped withdrawal symptoms.    Considering transfer to [facility].       Substance Use History :  Opioids:   Age at first use[age] used codeine cough syrup, oxycodone started age 29  Current use: substance: percocet 10 mg ; quantity 3- 6 pills daily; route: oral ; timing of last use: 9/16/24     IV drug use: No   History of overdose: No  Previous residential or outpatient treatments for addiction : Yes for court  Previous medication treatments for addiction: No  Longest period of sobriety: 5/12/23 to present  Medical complications related to substance use: denies  Hepatitis C:  5/17/23 HCV ab nonreactive  HIV: 5/17/23 HIV ag/ab  nonreactive     Other Addiction History:  Stimulants   Ecstasy as a child; h/o cocaine addiction, last use 2017  Sedatives/hypnotics/anxiolytics:   Xanax- age 25  Alcohol:   Rare   Nicotine:   vaping  Cannabis:   Occasionally   Hallucinogens/Dissociatives:   Occasionally  Eating disorder:  denies  Gambling:              denies     PAST PSYCHIATRIC HISTORY:  Diagnoses- PTSD  Suicide Attempts: Yes attempted to OD w/ cocaine 2017, entered treatment after   Hospitalizations: No         5/13/2024     9:00 AM 7/19/2024    11:00 AM 9/20/2024    11:00 AM   PHQ   PHQ-9 Total Score 0 9 10   Q9: Thoughts of better off dead/self-harm past 2 weeks Not at all Not at all Not at all       Social History  Housing status: alone  Employment status: Employed full time - [employer]  Relationship status: Single  Children: 6 children  Legal: denies      Labs discussed with patient?  Yes      [place] Prescription Drug Monitoring Program Reviewed:  Yes - last filled 7/19/24    Medications:  Current Outpatient Medications   Medication Sig Dispense Refill    buprenorphine HCl-naloxone HCl (SUBOXONE) 8-2 MG per film Place 2 Film under the tongue daily 60 Film 0    cloNIDine (CATAPRES) 0.1 MG tablet Take 1 tablet (0.1 mg) by mouth 2 times daily as needed (withdrawal) 8 tablet 0    naloxone (NARCAN) 4 MG/0.1ML nasal spray Spray 1 spray (4 mg) into one nostril alternating nostrils as needed for opioid reversal every 2-3 minutes until assistance arrives 0.2 mL 11    polyethylene glycol (MIRALAX) 17 GM/Dose powder Take 17 g by mouth daily as needed for constipation 578 g 11     No current facility-administered medications for this visit.       Allergies   Allergen Reactions    Cephalosporins Unknown    Amoxicillin Rash and Unknown       PMH, PSH, FamHx reviewed      OBJECTIVE                                                      /80   Pulse 71     Physical Exam  Vitals and nursing note reviewed.   Constitutional:       General: He is not in  acute distress.     Appearance: He is not ill-appearing or diaphoretic.   Eyes:      General: No scleral icterus.     Extraocular Movements: Extraocular movements intact.      Conjunctiva/sclera: Conjunctivae normal.   Pulmonary:      Effort: Pulmonary effort is normal.   Neurological:      General: No focal deficit present.      Mental Status: He is alert and oriented to person, place, and time.   Psychiatric:         Attention and Perception: Attention normal.         Mood and Affect: Mood and affect normal.         Speech: Speech normal.         Behavior: Behavior is cooperative.         Thought Content: Thought content normal.      Comments: Judgment and insight fair.         Labs:    UDS:    Lab Results   Component Value Date    BUP Screen Positive (A) 07/19/2024    BZO Negative 07/19/2024    BAR Negative 07/19/2024    FERNANDA Negative 07/19/2024    MAMP Negative 07/19/2024    AMP Negative 07/19/2024    MDMA Negative 07/19/2024    MTD Negative 07/19/2024    [id number] Negative 07/19/2024    OXY Screen Positive (A) 07/19/2024    PCP Negative 07/19/2024    THC Screen Positive (A) 07/19/2024    TEMP 90 F 07/19/2024    SGPOCT 1.025 07/19/2024     *POC urine drug screen does not screen for Fentanyl      Recent Results (from the past 240 hour(s))   Drugs of Abuse Screen Urine (POC CUPS) POCT    Collection Time: 09/20/24 11:55 AM   Result Value Ref Range    POCT Kit Lot Number [id number]     POCT Kit Expiration Date 7946886     Temperature Urine POCT 90 F 90 F, 92 F, 94 F, 96 F, 98 F, 100 F    Specific [place] POCT 1.025 1.005, 1.015, 1.025    pH Qual Urine POCT 5 pH 4 pH, 5 pH, 7 pH, 9 pH    Creatinine Qual Urine POCT 50 mg/dL 20 mg/dL, 50 mg/dL, 100 mg/dL, 200 mg/dL    Internal QC Qual Urine POCT Valid Valid    Amphetamine Qual Urine POCT Negative Negative    Barbiturate Qual Urine POCT Negative Negative    Buprenorphine Qual Urine POCT Screen Positive (A) Negative    Benzodiazepine Qual Urine POCT Negative Negative     Cocaine Qual Urine POCT Negative Negative    Methamphetamine Qual Urine POCT Negative Negative    MDMA Qual Urine POCT Negative Negative    Methadone Qual Urine POCT Negative Negative    Opiate Qual Urine POCT Negative Negative    Oxycodone Qual Urine POCT Screen Positive (A) Negative    Phencyclidine Qual Urine POCT Negative Negative    THC Qual Urine POCT Screen Positive (A) Negative       Odalis Jama MD  Addiction Medicine  [facility]  [address] [place] [address] [phone]

## 2024-09-23 ENCOUNTER — TELEPHONE (OUTPATIENT)
Dept: BEHAVIORAL HEALTH | Facility: CLINIC | Age: 34
End: 2024-09-23
Payer: COMMERCIAL

## 2024-09-23 NOTE — TELEPHONE ENCOUNTER
Received fax from pharmacy requesting prior authorization for Suboxone and naloxone. Phone call to pharmacy. Prescription went through insurance without difficulty when ran as brand name. No prior authorization needed. Patient already picked up both prescriptions on 09/20/2024.    Rashida Ordaz RN

## 2024-10-02 ENCOUNTER — TELEPHONE (OUTPATIENT)
Dept: BEHAVIORAL HEALTH | Facility: CLINIC | Age: 34
End: 2024-10-02
Payer: COMMERCIAL

## 2024-10-02 NOTE — TELEPHONE ENCOUNTER
Pt is scheduled for Sublocade on 10/17/24      - I have reviewed recommendations for comprehensive treatment plan with the patient  - I have reviewed the patient's medications comprehensively  and provided education to the patient on risks associated with concurrent use of benzodiazepines, alcohol, other sedatives with opioids  - I have recommended concomitant psychosocial support  - I have complied with all aspects of REMS program for Sublocade. [facility] where Sublocade will be administered is in compliance with all aspects of REMS program.   - Patient meets DSM-5 criteria for moderate or severe opioid use disorder  - Patient has been prescribed buprenorphine 8-24mg/day for at least one 1 week, demonstrating control of cravings and withdrawal symptoms as well as tolerance of buprenorphine.   - Patient will discontinue sublingual buprenorphine upon initiation of Sublocade  - Patient does not have evidence of tampering or attempts to remove the depot at the injection site.   - Patient does not have severe hepatic impairment.   - Patient does not have adrenal insufficiency.   - Patient does not have a history of long QT syndrome  - Patient does not take any antiarrhythmic medications or other medications known to significantly prolong QT interval   - Urine Drug Screen on 9/20/24 was positive for buprenorphine  - Patient will not be receiving methadone while on Sublocade  - Patient will not be receiving any other long acting buprenorphine products or long acting naltrexone while on Sublocade    - [place] including strengths and dates:    09/23/2024 09/20/2024 2 Suboxone 8 Mg-2 Mg Sl Film 50.00 30 Li Vol [id number] [id number] 0/0 13.33 mg Medicaid [place]   09/20/2024 09/20/2024 2 Suboxone 8 Mg-2 Mg Sl Film 10.00 5 Li Vol [id number] [facility] ([id number]) 0/0 16.00 mg Medicaid [place]

## 2024-10-07 ENCOUNTER — PATIENT OUTREACH (OUTPATIENT)
Dept: CARE COORDINATION | Facility: CLINIC | Age: 34
End: 2024-10-07
Payer: COMMERCIAL

## 2024-10-07 NOTE — LETTER
[facility] COORDINATION  Mental Health and Addiction   October 21, 2024    Edward Glover  [address] [place] [id number]      Dear Edward,    I have been attempting to reach you since our last contact. I would like to continue to work with you and provide any additional support you may need on achieving your health care related goals. I would appreciate if you would give me a call at [phone] to let me know if you would like to continue working together. I know that there are many things that can affect our ability to communicate and I hope we can continue to work together.    All of us at the Mental Health and Addiction Clinic are invested in your health and are here to assist you in meeting your goals.     Sincerely,    MARIAM Posada

## 2024-10-07 NOTE — PROGRESS NOTES
Clinic Care Coordination Contact  [facility]/Voicemail    Clinical Data: Care Coordinator Outreach    Outreach Documentation Number of Outreach Attempt   10/7/2024   1:53 PM 1       Attempted to reach patient for follow-up.  It appears that lead social work care coordinator and patient were workign toward insurance.  However, [place]:        Plan: Care Coordinator will try to reach patient again in two weeks to see if patient needs anything further.        MARIAM Shaw (for MARIAM Posada)  , Care Coordination  [facility]  [phone]

## 2024-10-18 ENCOUNTER — OFFICE VISIT (OUTPATIENT)
Dept: BEHAVIORAL HEALTH | Facility: CLINIC | Age: 34
End: 2024-10-18
Payer: COMMERCIAL

## 2024-10-18 VITALS — HEART RATE: 79 BPM | SYSTOLIC BLOOD PRESSURE: 137 MMHG | DIASTOLIC BLOOD PRESSURE: 85 MMHG

## 2024-10-18 DIAGNOSIS — Z79.891 ENCOUNTER FOR MONITORING OPIOID MAINTENANCE THERAPY: ICD-10-CM

## 2024-10-18 DIAGNOSIS — Z51.81 ENCOUNTER FOR MONITORING OPIOID MAINTENANCE THERAPY: ICD-10-CM

## 2024-10-18 DIAGNOSIS — F11.20 OPIOID USE DISORDER, SEVERE, DEPENDENCE (H): Primary | ICD-10-CM

## 2024-10-18 PROCEDURE — 80305 DRUG TEST PRSMV DIR OPT OBS: CPT | Performed by: FAMILY MEDICINE

## 2024-10-18 PROCEDURE — G2211 COMPLEX E/M VISIT ADD ON: HCPCS | Performed by: FAMILY MEDICINE

## 2024-10-18 PROCEDURE — 99215 OFFICE O/P EST HI 40 MIN: CPT | Performed by: FAMILY MEDICINE

## 2024-10-18 RX ORDER — BUPRENORPHINE AND NALOXONE 8; 2 MG/1; MG/1
2 FILM, SOLUBLE BUCCAL; SUBLINGUAL DAILY
Qty: 60 FILM | Refills: 1 | Status: SHIPPED | OUTPATIENT
Start: 2024-10-18

## 2024-10-18 ASSESSMENT — PATIENT HEALTH QUESTIONNAIRE - PHQ9: SUM OF ALL RESPONSES TO PHQ QUESTIONS 1-9: 0

## 2024-10-18 NOTE — NURSING NOTE
[facility]      Rooming information:    Approximate last use of full opioid agonist: not since last visit  Taking buprenorphine? Yes:   How much per day? 2 films  Number of buprenorphine films/tablets remaining currently: approx 3  Side effects related to buprenorphine (constipation, dry mouth, sedation?) No   Narcan currently available: Yes  Other recent substance use:    Cannabis   NICOTINE-Yes:   If using nicotine, ready to quit? No    Point of care urine drug screen positive for:  Lab Results   Component Value Date    BUP Screen Positive (A) 10/18/2024    BZO Negative 10/18/2024    BAR Negative 10/18/2024    FERNANDA Negative 10/18/2024    MAMP Negative 10/18/2024    AMP Negative 10/18/2024    MDMA Negative 10/18/2024    MTD Negative 10/18/2024    [id number] Negative 10/18/2024    OXY Negative 10/18/2024    PCP Negative 10/18/2024    THC Screen Positive (A) 10/18/2024    TEMP Invalid (A) 10/18/2024    SGPOCT 1.025 10/18/2024       *POC urine drug screen does not screen for Fentanyl        Depression Response    Patient completed the PHQ-9 assessment for depression and scored >9? No  Question 9 on the PHQ-9 was positive for suicidality? No  Does patient have current mental health provider? No  C-SSRS screener risk level.       Is this a virtual visit? No    I personally notified the following: BRONSON          10/18/2024     9:00 AM   PHQ Assesment Total Score(s)   PHQ-9 Score 0         Housing needs: stable    Insurance: active    Current legal issues: none    Contact information up to date? yes    3rd Party Involvement no today (please obtain YOBANY if pt would like to include)    Shruthi Barkley MA  October 18, 2024  9:37 AM

## 2024-10-18 NOTE — PROGRESS NOTES
[facility] Recovery [facility] Follow Up    ASSESSMENT/PLAN                                                    1. Opioid use disorder, severe, dependence (H)  Controlled  Continue SL buprenorphine 16mg/day  Continue recovery activities  Recommend individual therapy  - Drugs of Abuse Screen Urine (POC CUPS) POCT  - buprenorphine HCl-naloxone HCl (SUBOXONE) 8-2 MG per film; Place 2 Film under the tongue daily.  Dispense: 60 Film; Refill: 1    2. Encounter for monitoring opioid maintenance therapy  - Drugs of Abuse Screen Urine (POC CUPS) POCT      Return in about 2 months (around 12/18/2024).      Patient counseling completed today:  Discussed mechanism of action, potential risks/benefits/side effects of medications and other recommendations above.    Harm reduction counseling including never use alone, availability of naloxone, risks associated with concurrent use of opioids and benzodiazepines, alcohol, or other sedatives, safer administration as applicable.  Discussed importance of avoiding isolation, building a network of supportive relationships, avoiding people/places/things associated with past use to reduce risk of relapse; including motivational interviewing regarding psychosocial interventions.   SUBJECTIVE                                                        CC/HPI:  Edward Glover is a 34 year old male with PMH opioid use disorder on buprenorphine who presents to the Recovery Clinic for return visit.     Brief history with Recovery Clinic:   Edward Glover was first seen in Recovery Clinic on 05/17/23 interested in starting buprenorphine. He had recently stopped use of oxycodone and wanted to remain sober.   Prescribed buprenorphine through  after initial visit.    Intermittent follow-up with periodic return to use of oxycodone when out of buprenorphine.     Lost insurance, lost to follow up after 7/2024  RTC 9/20/24. Following brief return to use of oxycodone, resumed SL buprenorphine  9/19/24.  Interested in Sublocade.  Did not attend Sublocade appointment.       10/18/24 HPI  Pt states he is not ready to transfer to Sublocade, citing many stressors in his life right now.  He has been taking buprenorphine 16mg/day as prescribed.  Denies opioid cravings or return to use.  No c/o side effects.   He is still laid off from work, looking for other jobs.  He is looking at possible jobs in [place] and [place].  One of his dogs recently injured their leg and he is stressed about the potential cost of evaluation and treatment vs having to euthanize his dog.    He has been talking with his  for support.  Has a sponsor and participates in 12 step groups.        Substance Use History :  Opioids:   Age at first use: [age] used codeine cough syrup, oxycodone started age 29  Current use: substance: percocet 10 mg ; quantity 3- 6 pills daily; route: oral ; timing of last use: 9/16/24     IV drug use: No   History of overdose: No  Previous residential or outpatient treatments for addiction : Yes for court  Previous medication treatments for addiction: No  Longest period of sobriety: 5/12/23 to present  Medical complications related to substance use: denies  Hepatitis C:  5/17/23 HCV ab nonreactive  HIV: 5/17/23 HIV ag/ab nonreactive     Other Addiction History:  Stimulants   Ecstasy as a child; h/o cocaine addiction, last use 2017  Sedatives/hypnotics/anxiolytics:   Xanax- age 25  Alcohol:   Rare   Nicotine:   vaping  Cannabis:   Occasionally   Hallucinogens/Dissociatives:   Occasionally  Eating disorder:  denies  Gambling:              denies     PAST PSYCHIATRIC HISTORY:  Diagnoses- PTSD  Suicide Attempts: Yes attempted to OD w/ cocaine 2017, entered treatment after   Hospitalizations: No         7/19/2024    11:00 AM 9/20/2024    11:00 AM 10/18/2024     9:00 AM   PHQ   PHQ-9 Total Score 9 10 0   Q9: Thoughts of better off dead/self-harm past 2 weeks Not at all Not at all Not at all       Social History  Housing  status: alone  Employment status: unemployed, looking for work.  [employer].   Relationship status: Single  Children: 6 children  Legal: denies      [place] Prescription Drug Monitoring Program Reviewed:  Yes   09/23/2024 09/20/2024 2 Suboxone 8 Mg-2 Mg Sl Film 50.00 30 Li Vol [id number] [facility] ([id number]) 0/0 13.33 mg Medicaid [place]   09/20/2024 09/20/2024 2 Suboxone 8 Mg-2 Mg Sl Film 10.00 5 Li Vol [id number] [facility] ([id number]) 0/          Medications:  Current Outpatient Medications   Medication Sig Dispense Refill    buprenorphine HCl-naloxone HCl (SUBOXONE) 8-2 MG per film Place 2 Film under the tongue daily. 60 Film 0    cloNIDine (CATAPRES) 0.1 MG tablet Take 1 tablet (0.1 mg) by mouth 2 times daily as needed (withdrawal). 8 tablet 0    naloxone (NARCAN) 4 MG/0.1ML nasal spray Spray 1 spray (4 mg) into one nostril alternating nostrils as needed for opioid reversal. every 2-3 minutes until assistance arrives 0.2 mL 11    polyethylene glycol (MIRALAX) 17 GM/Dose powder Take 17 g by mouth daily as needed for constipation. 578 g 11     No current facility-administered medications for this visit.       Allergies   Allergen Reactions    Cephalosporins Unknown    Amoxicillin Rash and Unknown       PMH, PSH, FamHx reviewed      OBJECTIVE                                                      /85   Pulse 79     Physical Exam  Vitals and nursing note reviewed.   Constitutional:       General: He is not in acute distress.  Eyes:      General: No scleral icterus.     Extraocular Movements: Extraocular movements intact.      Conjunctiva/sclera: Conjunctivae normal.   Pulmonary:      Effort: Pulmonary effort is normal.   Neurological:      General: No focal deficit present.      Mental Status: He is alert and oriented to person, place, and time.   Psychiatric:         Attention and Perception: Attention normal.         Mood and Affect: Mood and affect normal.         Speech: Speech normal.         Behavior: Behavior is cooperative.          Thought Content: Thought content normal.      Comments: Judgment and insight good         Labs:    UDS:    Lab Results   Component Value Date    BUP Screen Positive (A) 10/18/2024    BZO Negative 10/18/2024    BAR Negative 10/18/2024    FERNANDA Negative 10/18/2024    MAMP Negative 10/18/2024    AMP Negative 10/18/2024    MDMA Negative 10/18/2024    MTD Negative 10/18/2024    [id number] Negative 10/18/2024    OXY Negative 10/18/2024    PCP Negative 10/18/2024    THC Screen Positive (A) 10/18/2024    TEMP Invalid (A) 10/18/2024    SGPOCT 1.025 10/18/2024     *POC urine drug screen does not screen for Fentanyl      Recent Results (from the past 240 hour(s))   Drugs of Abuse Screen Urine (POC CUPS) POCT    Collection Time: 10/18/24  9:42 AM   Result Value Ref Range    POCT Kit Lot Number [id number]     POCT Kit Expiration Date 2514957     Temperature Urine POCT Invalid (A) 90 F, 92 F, 94 F, 96 F, 98 F, 100 F    Specific [place] POCT 1.025 1.005, 1.015, 1.025    pH Qual Urine POCT 5 pH 4 pH, 5 pH, 7 pH, 9 pH    Creatinine Qual Urine POCT 100 mg/dL 20 mg/dL, 50 mg/dL, 100 mg/dL, 200 mg/dL    Internal QC Qual Urine POCT Valid Valid    Amphetamine Qual Urine POCT Negative Negative    Barbiturate Qual Urine POCT Negative Negative    Buprenorphine Qual Urine POCT Screen Positive (A) Negative    Benzodiazepine Qual Urine POCT Negative Negative    Cocaine Qual Urine POCT Negative Negative    Methamphetamine Qual Urine POCT Negative Negative    MDMA Qual Urine POCT Negative Negative    Methadone Qual Urine POCT Negative Negative    Opiate Qual Urine POCT Negative Negative    Oxycodone Qual Urine POCT Negative Negative    Phencyclidine Qual Urine POCT Negative Negative    THC Qual Urine POCT Screen Positive (A) Negative     At least 40min spent on day of visit in review of medical record,  review, obtaining histories, discussing recommendations, counseling, providing support    The longitudinal plan of care for the  diagnosis(es)/condition(s) as documented were addressed during this visit. Due to the added complexity in care, I will continue to support Edward in the subsequent management and with ongoing continuity of care.    Odalis Jama MD  Addiction Medicine  [place] [address] [address]  [phone]

## 2024-10-21 NOTE — PROGRESS NOTES
Clinic Care Coordination Contact  [facility]/Voicemail    Clinical Data: Care Coordinator Outreach    Outreach Documentation Number of Outreach Attempt   10/7/2024   1:53 PM 1   10/21/2024  10:55 AM 2       Left message on patient's voicemail with call back information and requested return call.    Plan: Care Coordinator will send unable to contact letter with care coordinator contact information via [employer]. Care Coordinator will try to reach patient again in 1 month.    MARIAM Posada? Social Work Care Coordinator   [facility]  [email or web address]? [email or web address]    Phone: [phone]  she/her

## 2025-02-10 ENCOUNTER — TELEPHONE (OUTPATIENT)
Dept: BEHAVIORAL HEALTH | Facility: CLINIC | Age: 35
End: 2025-02-10

## 2025-02-10 NOTE — TELEPHONE ENCOUNTER
Writer spoke with patient who reports he was not given a 7 day supply of Suboxone on 02/07/25 of which was sent to the pharmacy.     Writer spoke with [facility]'s pharmacy who reported they did not have enough films in stock at the time. The remaining films have been filled and are ready for .     Writer notified patient. Patient will follow-up in the Recovery Clinic on a walk in basis this week.     Rashida Ordaz RN on 2/10/2025 at 3:28 PM

## 2025-02-10 NOTE — TELEPHONE ENCOUNTER
Reason for call:  Other   Patient called regarding (reason for call): returning call  Additional comments: pt had appointment at 230 pm and had [address] at the same time as appt- tried to send Clari message - please call pt thanks     Phone number to reach patient:  Home number on file [phone] (home)    Best Time:  any     Can we leave a detailed message on this number?  YES    Travel screening: Negative

## 2025-02-17 ENCOUNTER — OFFICE VISIT (OUTPATIENT)
Dept: BEHAVIORAL HEALTH | Facility: CLINIC | Age: 35
End: 2025-02-17
Payer: COMMERCIAL

## 2025-02-17 VITALS — SYSTOLIC BLOOD PRESSURE: 129 MMHG | HEART RATE: 76 BPM | DIASTOLIC BLOOD PRESSURE: 78 MMHG | OXYGEN SATURATION: 99 %

## 2025-02-17 DIAGNOSIS — Z79.891 ENCOUNTER FOR MONITORING OPIOID MAINTENANCE THERAPY: ICD-10-CM

## 2025-02-17 DIAGNOSIS — Z51.81 ENCOUNTER FOR MONITORING OPIOID MAINTENANCE THERAPY: ICD-10-CM

## 2025-02-17 DIAGNOSIS — F11.20 OPIOID USE DISORDER, SEVERE, DEPENDENCE (H): ICD-10-CM

## 2025-02-17 PROCEDURE — 80305 DRUG TEST PRSMV DIR OPT OBS: CPT | Performed by: FAMILY MEDICINE

## 2025-02-17 PROCEDURE — G2211 COMPLEX E/M VISIT ADD ON: HCPCS | Mod: GC | Performed by: FAMILY MEDICINE

## 2025-02-17 PROCEDURE — 99214 OFFICE O/P EST MOD 30 MIN: CPT | Mod: GC | Performed by: FAMILY MEDICINE

## 2025-02-17 RX ORDER — BUPRENORPHINE AND NALOXONE 8; 2 MG/1; MG/1
2 FILM, SOLUBLE BUCCAL; SUBLINGUAL DAILY
Qty: 60 FILM | Refills: 0 | Status: SHIPPED | OUTPATIENT
Start: 2025-02-17

## 2025-02-17 ASSESSMENT — PATIENT HEALTH QUESTIONNAIRE - PHQ9: SUM OF ALL RESPONSES TO PHQ QUESTIONS 1-9: 1

## 2025-02-17 NOTE — PROGRESS NOTES
[facility] Follow Up    ASSESSMENT/PLAN                                                    1. Opioid use disorder, severe, dependence (H)  Controlled. Continue SL buprenorphine 16mg/day. Encouraged recovery activities including group/meetings. Encouraged individual therapy. Interested in Sublocade though not ready to transition - will discuss again at next visit.  - Adult Mental Health  Referral; Future  - Drugs of Abuse Screen Urine (POC CUPS) POCT  - buprenorphine HCl-naloxone HCl (SUBOXONE) 8-2 MG per film; Place 2 Film under the tongue daily.  Dispense: 60 Film; Refill: 0  - Confirmed access to Narcan    2. Encounter for monitoring opioid maintenance therapy  - Drugs of Abuse Screen Urine (POC CUPS) POCT    Return in about 4 weeks (around 3/17/2025) for Follow up.    Patient counseling completed today:  Discussed mechanism of action, potential risks/benefits/side effects of medications and other recommendations above.    Harm reduction counseling including never use alone, availability of naloxone, risks associated with concurrent use of opioids and benzodiazepines, alcohol, or other sedatives, safer administration as applicable.  Discussed importance of avoiding isolation, building a network of supportive relationships, avoiding people/places/things associated with past use to reduce risk of relapse; including motivational interviewing regarding psychosocial interventions.   SUBJECTIVE                                                        CC/HPI:  Edward Glover is a 34 year old male with PMH opioid use disorder on buprenorphine who presents to the Recovery Clinic for return visit.     Brief history with Recovery Clinic:   Edward Glover was first seen in Recovery Clinic on 05/17/23 interested in starting buprenorphine. He had recently stopped use of oxycodone and wanted to remain sober.   Prescribed buprenorphine through  after initial visit.    Intermittent  follow-up with periodic return to use of oxycodone when out of buprenorphine.     Lost insurance, lost to follow up after 7/2024.  RTC 9/20/24. Following brief return to use of oxycodone, resumed SL buprenorphine 9/19/24.  Interested in Sublocade, though did not attend Sublocade appointment and has continued Suboxone 16mg daily.    Recommendations 10/18/24 visit:  1. Opioid use disorder, severe, dependence (H)  Controlled  Continue SL buprenorphine 16mg/day  Continue recovery activities  Recommend individual therapy  - Drugs of Abuse Screen Urine (POC CUPS) POCT  - buprenorphine HCl-naloxone HCl (SUBOXONE) 8-2 MG per film; Place 2 Film under the tongue daily.  Dispense: 60 Film; Refill: 1     2. Encounter for monitoring opioid maintenance therapy  - Drugs of Abuse Screen Urine (POC CUPS) POCT    02/17/2025 HPI:  He reports he has been doing well since his last visit. He was having some scheduling and transportation issues but things are improved now. No side effects from his Suboxone. He has been taking 16mg daily, no return to use or cravings. Long term he'd like to taper off of buprenorphine, but for now would like to continue. He is considering transitioning to Sublocade, but does not like needles so this has been a keven. He wants to think about it and return to clinic in a month and discuss again.      Substance Use History :  Opioids:   Age at first use: [age] used codeine cough syrup, oxycodone started age 29  Current use: substance: percocet 10 mg ; quantity 3- 6 pills daily; route: oral ; timing of last use: 9/16/24     IV drug use: No   History of overdose: No  Previous residential or outpatient treatments for addiction : Yes for court  Previous medication treatments for addiction: No  Longest period of sobriety: 5/12/23 to present  Medical complications related to substance use: denies  Hepatitis C:  5/17/23 HCV ab nonreactive  HIV: 5/17/23 HIV ag/ab nonreactive     Other Addiction History:  Stimulants    Ecstasy as a child; h/o cocaine addiction, last use 2017  Sedatives/hypnotics/anxiolytics:   Xanax- age 25  Alcohol:   Rare   Nicotine:   vaping  Cannabis:   Occasionally   Hallucinogens/Dissociatives:   Occasionally  Eating disorder:  denies  Gambling:              denies     PAST PSYCHIATRIC HISTORY:  Diagnoses- PTSD  Suicide Attempts: Yes attempted to OD w/ cocaine 2017, entered treatment after   Hospitalizations: No         9/20/2024    11:00 AM 10/18/2024     9:00 AM 2/17/2025    11:00 AM   PHQ   PHQ-9 Total Score 10 0 1   Q9: Thoughts of better off dead/self-harm past 2 weeks Not at all Not at all Not at all       Social History  Housing status: alone  Employment status: unemployed, looking for work.  [employer].   Relationship status: Single  Children: 6 children  Legal: malloryies    [place] Prescription Drug Monitoring Program Reviewed:  Yes     Medications:  Current Outpatient Medications   Medication Sig Dispense Refill    buprenorphine HCl-naloxone HCl (SUBOXONE) 8-2 MG per film Place 2 Film under the tongue daily. 60 Film 0    cloNIDine (CATAPRES) 0.1 MG tablet Take 1 tablet (0.1 mg) by mouth 2 times daily as needed (withdrawal). 8 tablet 0    naloxone (NARCAN) 4 MG/0.1ML nasal spray Spray 1 spray (4 mg) into one nostril alternating nostrils as needed for opioid reversal. every 2-3 minutes until assistance arrives 0.2 mL 11    polyethylene glycol (MIRALAX) 17 GM/Dose powder Take 17 g by mouth daily as needed for constipation. 578 g 11     No current facility-administered medications for this visit.       Allergies   Allergen Reactions    Cephalosporins Unknown    Amoxicillin Rash and Unknown       PMH, PSH, FamHx reviewed      OBJECTIVE                                                      /78   Pulse 76   SpO2 99%     Physical Exam  Vitals and nursing note reviewed.   Constitutional:       General: He is not in acute distress.  Eyes:      General: No scleral icterus.     Extraocular  Movements: Extraocular movements intact.      Conjunctiva/sclera: Conjunctivae normal.   Pulmonary:      Effort: Pulmonary effort is normal.   Neurological:      General: No focal deficit present.      Mental Status: He is alert and oriented to person, place, and time.   Psychiatric:         Attention and Perception: Attention normal.         Mood and Affect: Mood and affect normal.         Speech: Speech normal.         Behavior: Behavior is cooperative.         Thought Content: Thought content normal.      Comments: Judgment and insight good         Labs:    UDS:    Lab Results   Component Value Date    BUP Screen Positive (A) 02/17/2025    BZO Negative 02/17/2025    BAR Negative 02/17/2025    FERNANDA Negative 02/17/2025    MAMP Negative 02/17/2025    AMP Negative 02/17/2025    MDMA Negative 02/17/2025    MTD Negative 02/17/2025    [id number] Negative 02/17/2025    OXY Negative 02/17/2025    PCP Negative 02/17/2025    THC Screen Positive (A) 02/17/2025    TEMP 90 F 02/17/2025    SGPOCT 1.025 02/17/2025     *POC urine drug screen does not screen for Fentanyl      Recent Results (from the past 240 hours)   Drugs of Abuse Screen Urine (POC CUPS) POCT    Collection Time: 02/17/25 11:15 AM   Result Value Ref Range    POCT Kit Lot Number [id number]     POCT Kit Expiration Date 08/05/2026     Temperature Urine POCT 90 F 90 F, 92 F, 94 F, 96 F, 98 F, 100 F    Specific [place] POCT 1.025 1.005, 1.015, 1.025    pH Qual Urine POCT 7 pH 4 pH, 5 pH, 7 pH, 9 pH    Creatinine Qual Urine POCT 50 mg/dL 20 mg/dL, 50 mg/dL, 100 mg/dL, 200 mg/dL    Internal QC Qual Urine POCT Valid Valid    Amphetamine Qual Urine POCT Negative Negative    Barbiturate Qual Urine POCT Negative Negative    Buprenorphine Qual Urine POCT Screen Positive (A) Negative    Benzodiazepine Qual Urine POCT Negative Negative    Cocaine Qual Urine POCT Negative Negative    Methamphetamine Qual Urine POCT Negative Negative    MDMA Qual Urine POCT Negative Negative     Methadone Qual Urine POCT Negative Negative    Opiate Qual Urine POCT Negative Negative    Oxycodone Qual Urine POCT Negative Negative    Phencyclidine Qual Urine POCT Negative Negative    THC Qual Urine POCT Screen Positive (A) Negative     The longitudinal plan of care for the diagnosis(es)/condition(s) as documented were addressed during this visit. Due to the added complexity in care, I will continue to support Edward in the subsequent management and with ongoing continuity of care.    Rojelio Ferrell MD  Addiction Medicine  [place] [address] [address] [address]  [phone]

## 2025-02-17 NOTE — NURSING NOTE
[facility]      Rooming information:    Approximate last use of full opioid agonist: 9/2024  Taking buprenorphine? Yes: suboxone  How much per day?   Number of buprenorphine films/tablets remaining currently: 1  Side effects related to buprenorphine (constipation, dry mouth, sedation?) No   Narcan currently available: Yes  Other recent substance use:    Cannabis   NICOTINE-Yes:   If using nicotine, ready to quit? No    Point of care urine drug screen positive for:  Lab Results   Component Value Date    BUP Screen Positive (A) 02/17/2025    BZO Negative 02/17/2025    BAR Negative 02/17/2025    FERNANDA Negative 02/17/2025    MAMP Negative 02/17/2025    AMP Negative 02/17/2025    MDMA Negative 02/17/2025    MTD Negative 02/17/2025    [id number] Negative 02/17/2025    OXY Negative 02/17/2025    PCP Negative 02/17/2025    THC Screen Positive (A) 02/17/2025    TEMP 90 F 02/17/2025    SGPOCT 1.025 02/17/2025       *POC urine drug screen does not screen for Fentanyl      Depression Response    Patient completed the PHQ-9 assessment for depression and scored >9? No  Question 9 on the PHQ-9 was positive for suicidality? No  Does patient have current mental health provider? No  C-SSRS screener risk level.       Is this a virtual visit? No    I personally notified the following: visit provider          2/17/2025    11:00 AM   PHQ Assesment Total Score(s)   PHQ-9 Score 1         Housing needs: stable     Insurance: active     Current legal issues: none     Contact information up to date? yes     3rd Party Involvement no today (please obtain YOBANY if pt would like to include)         Saniya Newman MA  February 17, 2025  11:28 AM     To get better and follow your care plan as instructed.

## 2025-05-29 DIAGNOSIS — F11.20 OPIOID USE DISORDER, SEVERE, DEPENDENCE (H): ICD-10-CM

## 2025-05-29 RX ORDER — BUPRENORPHINE AND NALOXONE 8; 2 MG/1; MG/1
2 FILM, SOLUBLE BUCCAL; SUBLINGUAL DAILY
Qty: 10 FILM | Refills: 0 | Status: SHIPPED | OUTPATIENT
Start: 2025-05-29

## 2025-05-29 NOTE — TELEPHONE ENCOUNTER
Writer notified patient of Suboxone bridge approval. Patient will follow-up in the Recovery Clinic on Monday 06/02/2025.    Rashida Ordaz RN on 5/29/2025 at 4:48 PM

## 2025-05-29 NOTE — TELEPHONE ENCOUNTER
Writer spoke with patient who reports he has not had a car to return to the clinic. Last appointment in the Recovery Clinic was 02/17/2025. He has been cutting the films into pieces to make them last, reports last dose of Suboxone was yesterday 05/28/2025. Patient is requesting a bridge of Suboxone to get through to Monday 06/02/2025 at which time he will present to the Recovery Clinic on a walk in basis.     Routing to Dr. Jama to further assist.     Rashida Ordaz RN on 5/29/2025 at 4:28 PM

## 2025-05-29 NOTE — TELEPHONE ENCOUNTER
Reason for call:  Other   Patient called regarding (reason for call): prescription  Additional comments: pt is requesting a 2 week bridge on sbxn until he is able to come into clinic - patient has no transportation and doesn't want to get sick     Phone number to reach patient:  Home number on file [phone] (home)    Best Time:  any    Can we leave a detailed message on this number?  YES    Travel screening: Negative

## 2025-08-10 ENCOUNTER — HEALTH MAINTENANCE LETTER (OUTPATIENT)
Age: 35
End: 2025-08-10

## 2025-08-14 ENCOUNTER — OFFICE VISIT (OUTPATIENT)
Dept: BEHAVIORAL HEALTH | Facility: CLINIC | Age: 35
End: 2025-08-14
Payer: COMMERCIAL

## 2025-08-14 VITALS — SYSTOLIC BLOOD PRESSURE: 125 MMHG | HEART RATE: 80 BPM | DIASTOLIC BLOOD PRESSURE: 80 MMHG

## 2025-08-14 DIAGNOSIS — Z79.891 ENCOUNTER FOR MONITORING OPIOID MAINTENANCE THERAPY: ICD-10-CM

## 2025-08-14 DIAGNOSIS — F11.93 OPIOID WITHDRAWAL (H): ICD-10-CM

## 2025-08-14 DIAGNOSIS — F11.20 OPIOID USE DISORDER, SEVERE, DEPENDENCE (H): Primary | ICD-10-CM

## 2025-08-14 DIAGNOSIS — Z51.81 ENCOUNTER FOR MONITORING OPIOID MAINTENANCE THERAPY: ICD-10-CM

## 2025-08-14 DIAGNOSIS — F12.90 CANNABIS USE, UNCOMPLICATED: ICD-10-CM

## 2025-08-14 LAB
AMPHETAMINE QUAL URINE POCT: NEGATIVE
BARBITURATE QUAL URINE POCT: NEGATIVE
BENZODIAZEPINE QUAL URINE POCT: NEGATIVE
BUPRENORPHINE QUAL URINE POCT: NEGATIVE
COCAINE QUAL URINE POCT: NEGATIVE
CREAT UR-MCNC: 473 MG/DL
CREATININE QUAL URINE POCT: ABNORMAL
INTERNAL QC QUAL URINE POCT: ABNORMAL
MDMA QUAL URINE POCT: NEGATIVE
METHADONE QUAL URINE POCT: NEGATIVE
METHAMPHETAMINE QUAL URINE POCT: NEGATIVE
OPIATE QUAL URINE POCT: NEGATIVE
OXYCODONE QUAL URINE POCT: NEGATIVE
PH QUAL URINE POCT: ABNORMAL
PHENCYCLIDINE QUAL URINE POCT: NEGATIVE
POCT KIT EXPIRATION DATE: ABNORMAL
POCT KIT LOT NUMBER: ABNORMAL
SPECIFIC GRAVITY POCT: 1.02
TEMPERATURE URINE POCT: ABNORMAL
THC QUAL URINE POCT: ABNORMAL

## 2025-08-14 RX ORDER — BUPRENORPHINE AND NALOXONE 8; 2 MG/1; MG/1
1 FILM, SOLUBLE BUCCAL; SUBLINGUAL 2 TIMES DAILY
Qty: 30 FILM | Refills: 0 | Status: SHIPPED | OUTPATIENT
Start: 2025-08-14

## 2025-08-14 ASSESSMENT — PATIENT HEALTH QUESTIONNAIRE - PHQ9: SUM OF ALL RESPONSES TO PHQ QUESTIONS 1-9: 0

## 2025-08-18 ENCOUNTER — PATIENT OUTREACH (OUTPATIENT)
Dept: CARE COORDINATION | Facility: CLINIC | Age: 35
End: 2025-08-18
Payer: COMMERCIAL

## 2025-08-18 LAB
BZE UR CFM-MCNC: 76 NG/ML
BZE/CREAT UR: 16 NG/MG {CREAT}
OXYCODONE UR CFM-MCNC: 6360 NG/ML
OXYCODONE/CREAT UR: 1345 NG/MG {CREAT}
OXYMORPHONE UR CFM-MCNC: 6360 NG/ML
OXYMORPHONE/CREAT UR: 1345 NG/MG {CREAT}